# Patient Record
Sex: FEMALE | Employment: OTHER
[De-identification: names, ages, dates, MRNs, and addresses within clinical notes are randomized per-mention and may not be internally consistent; named-entity substitution may affect disease eponyms.]

---

## 2017-07-15 ENCOUNTER — HEALTH MAINTENANCE LETTER (OUTPATIENT)
Age: 57
End: 2017-07-15

## 2019-07-21 ENCOUNTER — HOSPITAL ENCOUNTER (EMERGENCY)
Facility: CLINIC | Age: 59
Discharge: HOME | End: 2019-07-21
Attending: INTERNAL MEDICINE | Admitting: INTERNAL MEDICINE

## 2019-07-21 VITALS
WEIGHT: 154 LBS | SYSTOLIC BLOOD PRESSURE: 197 MMHG | DIASTOLIC BLOOD PRESSURE: 94 MMHG | HEART RATE: 91 BPM | OXYGEN SATURATION: 96 % | RESPIRATION RATE: 18 BRPM | TEMPERATURE: 98.2 F

## 2019-07-21 DIAGNOSIS — K08.89 PAIN, DENTAL: ICD-10-CM

## 2019-07-21 PROCEDURE — 99283 EMERGENCY DEPT VISIT LOW MDM: CPT | Mod: 25 | Performed by: INTERNAL MEDICINE

## 2019-07-21 PROCEDURE — 64400 NJX AA&/STRD TRIGEMINAL NRV: CPT | Mod: Z6 | Performed by: INTERNAL MEDICINE

## 2019-07-21 PROCEDURE — 64400 NJX AA&/STRD TRIGEMINAL NRV: CPT | Performed by: INTERNAL MEDICINE

## 2019-07-21 PROCEDURE — 25000125 ZZHC RX 250: Performed by: INTERNAL MEDICINE

## 2019-07-21 RX ORDER — PENICILLIN V POTASSIUM 500 MG/1
500 TABLET, FILM COATED ORAL 4 TIMES DAILY
Qty: 40 TABLET | Refills: 0 | Status: SHIPPED | OUTPATIENT
Start: 2019-07-21 | End: 2019-07-31

## 2019-07-21 RX ORDER — CANDESARTAN 16 MG/1
16 TABLET ORAL DAILY
COMMUNITY

## 2019-07-21 RX ORDER — TRAMADOL HYDROCHLORIDE 50 MG/1
50 TABLET ORAL EVERY 6 HOURS PRN
Qty: 2 TABLET | Refills: 0 | Status: SHIPPED | OUTPATIENT
Start: 2019-07-21 | End: 2020-07-28

## 2019-07-21 RX ORDER — BUPIVACAINE HYDROCHLORIDE AND EPINEPHRINE 5; 5 MG/ML; UG/ML
10 INJECTION, SOLUTION PERINEURAL ONCE
Status: COMPLETED | OUTPATIENT
Start: 2019-07-21 | End: 2019-07-21

## 2019-07-21 RX ORDER — BUPIVACAINE HYDROCHLORIDE AND EPINEPHRINE 5; 5 MG/ML; UG/ML
10 INJECTION, SOLUTION EPIDURAL; INTRACAUDAL; PERINEURAL ONCE
Status: DISCONTINUED | OUTPATIENT
Start: 2019-07-21 | End: 2019-07-21

## 2019-07-21 RX ADMIN — BUPIVACAINE HYDROCHLORIDE AND EPINEPHRINE BITARTRATE 10 ML: 5; .005 INJECTION, SOLUTION EPIDURAL; INTRACAUDAL; PERINEURAL at 21:52

## 2019-07-21 NOTE — ED AVS SNAPSHOT
[facility], [place], Emergency Department  [address] [facility] [address]  Phone:  [phone]                                    Yane Hamlin   MRN: [id number]    Department:  [facility], Emergency Department   Date of Visit:  7/21/2019           After Visit Summary Signature Page    I have received my discharge instructions, and my questions have been answered. I have discussed any challenges I see with this plan with the nurse or doctor.    ..........................................................................................................................................  Patient/Patient Representative Signature      ..........................................................................................................................................  Patient Representative Print Name and Relationship to Patient    ..................................................               ................................................  Date                                   Time    ..........................................................................................................................................  Reviewed by Signature/Title    ...................................................              ..............................................  Date                                               Time          22EPIC Rev 08/18

## 2019-07-22 ENCOUNTER — NURSE TRIAGE (OUTPATIENT)
Dept: NURSING | Facility: CLINIC | Age: 59
End: 2019-07-22

## 2019-07-22 NOTE — ED PROVIDER NOTES
History     Chief Complaint   Patient presents with     Dental Pain     HPI  Yane Hamlin is a 58 year old female with a history of hypertension, who presents to the Emergency Department for evaluation of dental pain. Patient complains of right lower molar pain, of tooth #20, ongoing for a few days. She reportedly had a dental procedure done a month ago, and recently developed the pain. She apparently spoke with her clinic or dentist who reportedly instructed her to come here for further evaluation of her elevated blood pressure, thought related to her pain, as well as for management of her pain until she is able to be seen by her dentist tomorrow. Patient states that she has attempted to manage her pain with over-the-counter remedies including Tylenol and ibuprofen to no avail. She denies any other complaints at this time.     I have reviewed the Medications, Allergies, Past Medical and Surgical History, and Social History in the [facility] system.    Past Medical History:   Diagnosis Date     Hypertension        History reviewed. No pertinent surgical history.    History reviewed. No pertinent family history.    Social History     Tobacco Use     Smoking status: Never Smoker     Smokeless tobacco: Never Used   Substance Use Topics     Alcohol use: Not on file     No current facility-administered medications for this encounter.      Current Outpatient Medications   Medication     candesartan (ATACAND) 16 MG tablet     penicillin V (VEETID) 500 MG tablet     traMADol (ULTRAM) 50 MG tablet      No Known Allergies    Review of Systems   HENT: Positive for dental problem (tooth #20).    All other systems reviewed and are negative.      Physical Exam   BP: (!) 206/104  Pulse: 91  Heart Rate: 97  Temp: 98.2  F (36.8  C)  Resp: 16  Weight: 69.9 kg (154 lb)  SpO2: 96 %      Physical Exam   HENT:   Mouth/Throat: No oral lesions. No trismus in the jaw. Abnormal dentition. Dental caries present. No dental abscesses or uvula  swelling.           ED Course        Procedures               Labs Ordered and Resulted from Time of ED Arrival Up to the Time of Departure from the ED - No data to display         Assessments & Plan (with Medical Decision Making)  Dental caries on tooth #20 but no periapical abscess, s/p dental block with bupivacaine with epi 3 ml with relief, will discharge with pen vk, tramadol #2, follow up with her Dentist in am or low cost Dental.       I have reviewed the nursing notes.    I have reviewed the findings, diagnosis, plan and need for follow up with the patient.       Medication List      Started    penicillin V 500 MG tablet  Commonly known as:  VEETID  500 mg, Oral, 4 TIMES DAILY     traMADol 50 MG tablet  Commonly known as:  ULTRAM  50 mg, Oral, EVERY 6 HOURS PRN            Final diagnoses:   Pain, dental     ISerenity, am serving as a trained medical scribe to document services personally performed by Jack Olivo MD, based on the provider's statements to me.   Jack ARREDONDO MD, was physically present and have reviewed and verified the accuracy of this note documented by Serenity Shepard.    7/21/2019   [facility], [place], EMERGENCY DEPARTMENT     Jack Olivo MD  07/21/19 [id number]

## 2019-07-22 NOTE — ED TRIAGE NOTES
Pt arrived to the ED with c/o left lower tooth pain since Friday. Pt reports she has not been to a dentist for the pain because they are closed. Pt's BP is also elevated.

## 2019-07-22 NOTE — DISCHARGE INSTRUCTIONS
Please make an appointment to follow up with Your Dentist as soon as possible even if entirely better.    Many of these clinics offer a sliding fee option for patients that qualify, and see patients on a walk-in or same day basis. Please call each clinic directly. As services, hours, fees and policies vary greatly.    [place]:  [facility]     [phone]  [facility] ([facility]) [phone]  [facility]  [phone]  [facility]      [phone]   formerly [facility]    [phone]  [facility]  [phone]  [facility] ([facility]) [phone]  [facility]     [phone]  [facility]   [phone]  [facility] (cash only)   [phone]  [facility]    [phone] (adults)          [phone] (children)    [place]:  [facility]     [phone]; [phone]  [facility]     [phone]  [facility]     [phone]  [facility] (free, limited)    [phone]    Multiple Locations:  [facility]       [phone]

## 2019-07-22 NOTE — TELEPHONE ENCOUNTER
Yane had a local oral anesthetic per injection given to her when in [facility] ER last evening for dental caries pain.     She was also prescribed 2 50mg tramadol to cover her pain once the block wore off. She took one of the tramadol during the night and the second one already his morning.She said she didn't start the amoxicillin until this morning because she didn't pick it up until this morning.     A little discrepancy noted in that she apparently picked up the tramadol last night but didn't get the antibiotic until this morning.    She called stating she needs more pain medication from the ER doc she saw yesterday. She was argumentative when I told her the ER doc cannot write a RX for her. I told her she can return to the ER, call her pcp, go to an uc or try OTC's until the pain improves with the antibiotic.     She called her dentist today and apparently asked for pain medication. She told me her dentist said she won't write her a prescription for an opioid.  Yane didn't say if she made an appointment with her dentist or not.    Yane stated understanding of the options I gave her.  Iva ROSE RN [facility]

## 2020-07-28 ENCOUNTER — OFFICE VISIT (OUTPATIENT)
Dept: URGENT CARE | Facility: URGENT CARE | Age: 60
End: 2020-07-28
Payer: COMMERCIAL

## 2020-07-28 VITALS
OXYGEN SATURATION: 94 % | TEMPERATURE: 98 F | SYSTOLIC BLOOD PRESSURE: 162 MMHG | WEIGHT: 157.9 LBS | DIASTOLIC BLOOD PRESSURE: 90 MMHG | HEART RATE: 86 BPM

## 2020-07-28 DIAGNOSIS — I10 HYPERTENSION, UNSPECIFIED TYPE: Primary | ICD-10-CM

## 2020-07-28 LAB
ANION GAP SERPL CALCULATED.3IONS-SCNC: 13 MMOL/L (ref 3–14)
BASOPHILS # BLD AUTO: 0.1 10E9/L (ref 0–0.2)
BASOPHILS NFR BLD AUTO: 0.5 %
BUN SERPL-MCNC: 13 MG/DL (ref 7–30)
CALCIUM SERPL-MCNC: 9.6 MG/DL (ref 8.5–10.1)
CHLORIDE SERPL-SCNC: 101 MMOL/L (ref 94–109)
CO2 SERPL-SCNC: 28 MMOL/L (ref 20–32)
CREAT SERPL-MCNC: 0.9 MG/DL (ref 0.52–1.04)
DIFFERENTIAL METHOD BLD: NORMAL
EOSINOPHIL # BLD AUTO: 0.2 10E9/L (ref 0–0.7)
EOSINOPHIL NFR BLD AUTO: 1.9 %
ERYTHROCYTE [DISTWIDTH] IN BLOOD BY AUTOMATED COUNT: 12.5 % (ref 10–15)
GFR SERPL CREATININE-BSD FRML MDRD: 68 ML/MIN/{1.73_M2}
GLUCOSE SERPL-MCNC: 124 MG/DL (ref 70–99)
HCT VFR BLD AUTO: 41.3 % (ref 35–47)
HGB BLD-MCNC: 13.6 G/DL (ref 11.7–15.7)
LYMPHOCYTES # BLD AUTO: 2.7 10E9/L (ref 0.8–5.3)
LYMPHOCYTES NFR BLD AUTO: 27.8 %
MCH RBC QN AUTO: 30.4 PG (ref 26.5–33)
MCHC RBC AUTO-ENTMCNC: 32.9 G/DL (ref 31.5–36.5)
MCV RBC AUTO: 92 FL (ref 78–100)
MONOCYTES # BLD AUTO: 0.7 10E9/L (ref 0–1.3)
MONOCYTES NFR BLD AUTO: 6.8 %
NEUTROPHILS # BLD AUTO: 6.2 10E9/L (ref 1.6–8.3)
NEUTROPHILS NFR BLD AUTO: 63 %
PLATELET # BLD AUTO: 273 10E9/L (ref 150–450)
POTASSIUM SERPL-SCNC: 3.9 MMOL/L (ref 3.4–5.3)
RBC # BLD AUTO: 4.48 10E12/L (ref 3.8–5.2)
SODIUM SERPL-SCNC: 142 MMOL/L (ref 133–144)
TROPONIN I SERPL-MCNC: <0.015 UG/L (ref 0–0.04)
WBC # BLD AUTO: 9.9 10E9/L (ref 4–11)

## 2020-07-28 PROCEDURE — 99203 OFFICE O/P NEW LOW 30 MIN: CPT | Performed by: PHYSICIAN ASSISTANT

## 2020-07-28 PROCEDURE — 84484 ASSAY OF TROPONIN QUANT: CPT | Performed by: PHYSICIAN ASSISTANT

## 2020-07-28 PROCEDURE — 80048 BASIC METABOLIC PNL TOTAL CA: CPT | Performed by: PHYSICIAN ASSISTANT

## 2020-07-28 PROCEDURE — 36415 COLL VENOUS BLD VENIPUNCTURE: CPT | Performed by: PHYSICIAN ASSISTANT

## 2020-07-28 PROCEDURE — 93000 ELECTROCARDIOGRAM COMPLETE: CPT | Performed by: PHYSICIAN ASSISTANT

## 2020-07-28 PROCEDURE — 85025 COMPLETE CBC W/AUTO DIFF WBC: CPT | Performed by: PHYSICIAN ASSISTANT

## 2020-07-28 RX ORDER — HYDROCHLOROTHIAZIDE 12.5 MG/1
12.5 TABLET ORAL DAILY
Qty: 20 TABLET | Refills: 0 | Status: SHIPPED | OUTPATIENT
Start: 2020-07-28 | End: 2023-09-13

## 2020-07-29 NOTE — PATIENT INSTRUCTIONS
Start taking 1 pills (32 mg) daily for high blood pressure and continue to monitor BP.   Start taking hydrochlorothiazide daily and monitor BP.   Contact your PCP in regards to this visit today.    Patient Education     Established High Blood Pressure    High blood pressure (hypertension) is a chronic disease. Often, healthcare providers don t know what causes it. But it can be caused by certain health conditions and medicines.  If you have high blood pressure, you may not have any symptoms. If you do have symptoms, they may include headache, dizziness, changes in your vision, chest pain, and shortness of breath. But even without symptoms, high blood pressure that s not treated raises your risk for heart attack, heart failure, and stroke. High blood pressure is a serious health risk and shouldn t be ignored.  Blood pressure measurements are given as 2 numbers. Systolic blood pressure is the upper number. This is the pressure when the heart contracts. Diastolic blood pressure is the lower number. This is the pressure when the heart relaxes between beats. You will see your blood pressure readings written together. For example, a person with a systolic pressure of 118 and a diastolic pressure of 78 will have 118/78 written in the medical record.  Blood pressure is categorized as normal, elevated, or stage 1 or stage 2 high blood pressure:    Normal blood pressure is systolic of less than 120 and diastolic of less than 80 (120/80)    Elevated blood pressure is systolic of 120 to 129 and diastolic less than 80    Stage 1 high blood pressure is systolic is 130 to 139 or diastolic between 80 to 89    Stage 2 high blood pressure is when systolic is 140 or higher or the diastolic is 90 or higher  Home care  If you have high blood pressure, follow these home care guidelines to help lower your blood pressure. If you are taking medicines for high blood pressure, these methods may reduce or end your need for medicines in the  future.    Start a weight-loss program if you are overweight.    Cut back on how much salt you get in your diet. Here s how to do this:  ? Don t eat foods that have a lot of salt. These include olives, pickles, smoked meats, and salted potato chips.  ? Don t add salt to your food at the table.  ? Use only small amounts of salt when cooking.    Start an exercise program. Talk with your healthcare provider about the type of exercise program that would be best for you. It doesn't have to be hard. Even brisk walking for 20 minutes 3 times a week is a good form of exercise.    Don t take medicines that stimulate the heart. This includes many over-the-counter cold and sinus decongestant pills and sprays, as well as diet pills. Check the warnings about high blood pressure on the label. Before buying any over-the-counter medicines or supplements, always ask the pharmacist about the product's potential interaction with your high blood pressure and your high blood pressure medicines.    Stimulants such as amphetamine or cocaine could be deadly for someone with high blood pressure. Never take these.    Limit how much caffeine you get in your diet. Switch to caffeine-free products.    Stop smoking. If you are a long-time smoker, this can be hard. Talk to your healthcare provider about medicines and nicotine replacement options to help you. Also, enroll in a stop-smoking program to make it more likely that you will quit for good.    Learn how to handle stress. This is an important part of any program to lower blood pressure. Learn about relaxation methods like meditation, yoga, or biofeedback.    If your provider prescribed medicines, take them exactly as directed. Missing doses may cause your blood pressure get out of control.    If you miss a dose or doses, check with your healthcare provider or pharmacist about what to do.    Consider buying an automatic blood pressure machine to check your blood pressure at home. Ask your  provider for a recommendation. You can get one of these at most pharmacies.     The American Heart Association recommends the following guidelines for home blood pressure monitoring:    Don't smoke or drink coffee for 30 minutes before taking your blood pressure.    Go to the bathroom before the test.    Relax for 5 minutes before taking the measurement.    Sit with your back supported (don't sit on a couch or soft chair); keep your feet on the floor uncrossed. Place your arm on a solid flat surface (like a table) with the upper part of the arm at heart level. Place the middle of the cuff directly above the bend of the elbow. Check the monitor's instruction manual for an illustration.    Take multiple readings. When you measure, take 2 to 3 readings one minute apart and record all of the results.    Take your blood pressure at the same time every day, or as your healthcare provider recommends.    Record the date, time, and blood pressure reading.    Take the record with you to your next medical appointment. If your blood pressure monitor has a built-in memory, simply take the monitor with you to your next appointment.    Call your provider if you have several high readings. Don't be frightened by a single high blood pressure reading, but if you get several high readings, check in with your healthcare provider.    Note: When blood pressure reaches a systolic (top number) of 180 or higher OR diastolic (bottom number) of 110 or higher, seek emergency medical treatment.  Follow-up care  You will need to see your healthcare provider regularly. This is to check your blood pressure and to make changes to your medicines. Make a follow-up appointment as directed. Bring the record of your home blood pressure readings to the appointment.  When to seek medical advice  Call your healthcare provider right away if any of these occur:    Blood pressure reaches a systolic (upper number) of 180 or higher OR a diastolic (bottom  number) of 110 or higher    Chest pain or shortness of breath    Severe headache    Throbbing or rushing sound in the ears    Nosebleed    Sudden severe pain in your belly (abdomen)    Extreme drowsiness, confusion, or fainting    Dizziness or spinning sensation (vertigo)    Weakness of an arm or leg or one side of the face    You have problems speaking or seeing   Date Last Reviewed: 12/1/2016 2000-2019 [employer]. [address] [place] [address], [place] [address]. All rights reserved. This information is not intended as a substitute for professional medical care. Always follow your healthcare professional's instructions.

## 2020-07-29 NOTE — PROGRESS NOTES
CHIEF COMPLAINT:   Chief Complaint   Patient presents with     HIGH BLOOD PRESSURE     BLOOD PRESSURE RUNNING HIGH SINCE SUNDAY       HPI: Yane Hamlin is a 59 year old female who presents to clinic today for evaluation of high blood pressure. Patient reports that symptoms started 3 days ago. She took her BP at home and received a very high reading of 195/110. She took her BP medication and another medication called dipirone, which is an antiinflammatory, and her BP reduced. She continues to have episodes of high blood pressure. At times she does feel nausea, but she is not feeling this at this time. Denies having fever, chills, body aches, pleurisy, hemoptysis, cough, shortness of breath, congestion, diarrhea, vomiting, weakness, change of vision, headaches or syncope.       Past Medical History:   Diagnosis Date     Hypertension      No past surgical history on file.  Social History     Tobacco Use     Smoking status: Never Smoker     Smokeless tobacco: Never Used   Substance Use Topics     Alcohol use: Not on file     Current Outpatient Medications   Medication     candesartan (ATACAND) 16 MG tablet     No current facility-administered medications for this visit.      No Known Allergies    10 point ROS of systems including Constitutional, Eyes, Respiratory, Cardiovascular, Gastroenterology, Genitourinary, Integumentary, Muscularskeletal, Psychiatric were all negative except for pertinent positives noted in my HPI.        Exam:  BP (!) 176/97   Pulse 86   Temp 98  F (36.7  C)   Wt 71.6 kg (157 lb 14.4 oz)   SpO2 94%   Constitutional: healthy, alert and no distress  Head: Normocephalic, atraumatic.  Eyes: conjunctiva clear, no drainage  ENT: TMs clear and shiny sadi, nasal mucosa pink and moist, throat without tonsillar hypertrophy or erythema  Neck: neck is supple, no cervical lymphadenopathy or nuchal rigidity  Cardiovascular: RRR  Respiratory: CTA bilaterally, no rhonchi or rales  Gastrointestinal: soft and  nontender  Skin: no rashes  Neurologic: Speech clear, gait normal. Moves all extremities.    Results for orders placed or performed in visit on 07/28/20   CBC with platelets and differential     Status: None   Result Value Ref Range    WBC 9.9 4.0 - 11.0 10e9/L    RBC Count 4.48 3.8 - 5.2 10e12/L    Hemoglobin 13.6 11.7 - 15.7 g/dL    Hematocrit 41.3 35.0 - 47.0 %    MCV 92 78 - 100 fl    MCH 30.4 26.5 - 33.0 pg    MCHC 32.9 31.5 - 36.5 g/dL    RDW 12.5 10.0 - 15.0 %    Platelet Count 273 150 - 450 10e9/L    Diff Method Automated Method     % Neutrophils 63.0 %    % Lymphocytes 27.8 %    % Monocytes 6.8 %    % Eosinophils 1.9 %    % Basophils 0.5 %    Absolute Neutrophil 6.2 1.6 - 8.3 10e9/L    Absolute Lymphocytes 2.7 0.8 - 5.3 10e9/L    Absolute Monocytes 0.7 0.0 - 1.3 10e9/L    Absolute Eosinophils 0.2 0.0 - 0.7 10e9/L    Absolute Basophils 0.1 0.0 - 0.2 10e9/L   Basic metabolic panel  (Ca, Cl, CO2, Creat, Gluc, K, Na, BUN)     Status: Abnormal   Result Value Ref Range    Sodium 142 133 - 144 mmol/L    Potassium 3.9 3.4 - 5.3 mmol/L    Chloride 101 94 - 109 mmol/L    Carbon Dioxide 28 20 - 32 mmol/L    Anion Gap 13 3 - 14 mmol/L    Glucose 124 (H) 70 - 99 mg/dL    Urea Nitrogen 13 7 - 30 mg/dL    Creatinine 0.90 0.52 - 1.04 mg/dL    GFR Estimate 68 >60 mL/min/[1.73_m2]    GFR Estimate If Black 79 >60 mL/min/[1.73_m2]    Calcium 9.6 8.5 - 10.1 mg/dL   Troponin I     Status: None   Result Value Ref Range    Troponin I ES <0.015 0.000 - 0.045 ug/L       ekg -- Normal sinus rhythm    ASSESSMENT/PLAN:  1. Hypertension, unspecified type  Patient with elevated BP for the past 3 days. She does endorse having intermittent nausea. On exam, she looks well. NO evidence of ACS/MI on EKG and troponin is negative. Kidney function is WNL as are all her other labs. Her blood pressure is elevated in clinic, but not to the point of hypertensive emergency. She is not have headaches, chest pain, sob, change of vision or  lightheadedness.   Patient requesting medication injection to reduce BP. Suspect that she is speaking of Clonidine, which we do not carry in urgent care. She has room to increase her candesartan, so will increase that to 32mg and add on hydrochlorothiazide. Discussed that she should contact her PCP for further treatment of HTN. I do not feel that it is necessary for her to be seen in the ER, at this point, but went over in detail and outlined RED FLAG symptoms in handout given in clinic.     - EKG 12-lead complete w/read - Clinics  - CBC with platelets and differential  - Basic metabolic panel  (Ca, Cl, CO2, Creat, Gluc, K, Na, BUN)  - Troponin I  - hydrochlorothiazide (HYDRODIURIL) 12.5 MG tablet; Take 1 tablet (12.5 mg) by mouth daily  Dispense: 20 tablet; Refill: 0    Diagnosis and treatment plan was reviewed with patient and/or family.   We went over any labs or imaging. Discussed worsening symptoms or little to no relief despite treatment plan to follow-up with PCP or in ER.  Patient verbalizes understanding. All questions were addressed and answered.   Kamala Roman PA-C

## 2023-05-17 ENCOUNTER — TRANSFERRED RECORDS (OUTPATIENT)
Dept: HEALTH INFORMATION MANAGEMENT | Facility: CLINIC | Age: 63
End: 2023-05-17
Payer: COMMERCIAL

## 2023-05-17 ENCOUNTER — MEDICAL CORRESPONDENCE (OUTPATIENT)
Dept: HEALTH INFORMATION MANAGEMENT | Facility: CLINIC | Age: 63
End: 2023-05-17
Payer: COMMERCIAL

## 2023-05-22 ENCOUNTER — TRANSFERRED RECORDS (OUTPATIENT)
Dept: HEALTH INFORMATION MANAGEMENT | Facility: CLINIC | Age: 63
End: 2023-05-22
Payer: COMMERCIAL

## 2023-05-23 ENCOUNTER — TRANSCRIBE ORDERS (OUTPATIENT)
Dept: OTHER | Age: 63
End: 2023-05-23

## 2023-05-23 DIAGNOSIS — G47.30 SLEEP APNEA: Primary | ICD-10-CM

## 2023-06-01 ENCOUNTER — TRANSCRIBE ORDERS (OUTPATIENT)
Dept: OTHER | Age: 63
End: 2023-06-01

## 2023-06-01 DIAGNOSIS — R93.89 ABNORMAL CT OF THE CHEST: Primary | ICD-10-CM

## 2023-06-05 ENCOUNTER — TELEPHONE (OUTPATIENT)
Dept: PULMONOLOGY | Facility: CLINIC | Age: 63
End: 2023-06-05
Payer: COMMERCIAL

## 2023-06-05 NOTE — TELEPHONE ENCOUNTER
Left Voicemail with Family Member (1st Attempt) for the patient to call back and schedule the following:    Appointment type: NEW  Provider: NA  Return date: 6/5/23  Specialty phone number: [phone]  Additional appointment(s) needed: CXR, LAB  Additonal Notes: 6/5/23 - Spoke to [facility] staff; pt's referral provider Emily Vilchis PA-C, is not in the office today to schedule appointment for pt. Gave staff member yvonne call ctr phone number to pass on to provider. [facility]

## 2023-06-13 ENCOUNTER — TELEPHONE (OUTPATIENT)
Dept: PULMONOLOGY | Facility: CLINIC | Age: 63
End: 2023-06-13
Payer: COMMERCIAL

## 2023-06-13 NOTE — TELEPHONE ENCOUNTER
Left Voicemail with Family Member (2nd Attempt) for the patient to call back and schedule the following:    Appointment type: New  Provider: None  Return date: 6/13/2023  Specialty phone number: [phone]  Additional appointment(s) needed: CXR, Lab, FPFT  Additonal Notes: 2nd attempt. LVM for pt's provider to schedule NEW pt appt, per Adult Pulmonary Referral. Pt is in [facility] care; referral from CARLOTA Chavez, PA-C, General Physician Assistant, phone number [phone]. [facility]

## 2023-06-20 ENCOUNTER — TELEPHONE (OUTPATIENT)
Dept: PULMONOLOGY | Facility: CLINIC | Age: 63
End: 2023-06-20
Payer: COMMERCIAL

## 2023-06-20 NOTE — TELEPHONE ENCOUNTER
Patient Contacted     Appointment type: NEW  Provider: KAREN  Return date: 7/24/23  Specialty phone number: [phone]  Additional appointment(s) needed: NA  Additonal Notes: pt completed imaging and PFT already. Pt requested mailed itinerary. Pt's street address was verified.

## 2023-06-20 NOTE — TELEPHONE ENCOUNTER
Attempted to reach patient regarding pulmonary consultation that is scheduled with Dr. Brown on 07/24/2023. Patient did not answer call and her voicemail box is full.    Patient will need to sign an SAMUEL with [facility] so that  pulmonary medicine is able to obtain medical records, including PFT testing. If unable to obtain PFT report, patient will need to conduct a complete PFT prior to seeing pulmonologist, as there is no record of patient ever completing a PFT in medical record.    Patient has been scheduled for a tentative PFT in [place] on 07/27/2023 at 0930. Patient's consultation with Dr. Brown can be moved to 11:30 am if pt is needing to conduct the PFT at 0930.    Patient is not active on MyChart. Will attempt to contact at another time to explain.      Josey Estrada LPN  Pulmonary Medicine:  [facility]  Phone: [phone] Fax: [phone]

## 2023-06-22 NOTE — TELEPHONE ENCOUNTER
Spoke with patient regarding outside PFT testing. Patient is unsure if she has had this testing done. Informed patient that she will need to complete a PFT prior to being seen by pulmonary clinic. Patient is agreeable and has been scheduled for a PFT 07/24/2023 @ 12 pm.    Josey Estrada LPN  Pulmonary Medicine:  [facility]  Phone: [phone] Fax: [phone]

## 2023-08-08 ENCOUNTER — TELEPHONE (OUTPATIENT)
Dept: PULMONOLOGY | Facility: CLINIC | Age: 63
End: 2023-08-08
Payer: COMMERCIAL

## 2023-08-08 ENCOUNTER — TELEPHONE (OUTPATIENT)
Dept: SLEEP MEDICINE | Facility: CLINIC | Age: 63
End: 2023-08-08
Payer: COMMERCIAL

## 2023-08-08 DIAGNOSIS — R93.89 ABNORMAL CHEST CT: Primary | ICD-10-CM

## 2023-08-08 NOTE — TELEPHONE ENCOUNTER
Reason for Call:  Appointment Request    Patient requesting this type of appt:  Sleep consult    Requested provider:  any provider     Reason patient unable to be scheduled: Not within requested timeframe    When does patient want to be seen/preferred time:  Prefer Sept. Pt will not be   be in [place] by Oct 15 and be back next year.    Comments: Would like an appt before she leaves in Oct.    Okay to leave a detailed message?: Yes at Cell number on file:    Telephone Information:   Mobile [phone]       Call taken on 8/8/2023 at 3:24 PM by Bishop Wu

## 2023-08-08 NOTE — TELEPHONE ENCOUNTER
Left Voicemail (1st Attempt) for the patient to call back and schedule the following:    Appointment type: CXR  Provider: ANISA  Return date: 09/13/23  Specialty phone number: [phone]  Additional appointment(s) needed: N/A   Additonal Notes: N/A

## 2023-08-09 NOTE — CONFIDENTIAL NOTE
RECORDS RECEIVED FROM: internal    DATE RECEIVED: 9.13.23    NOTES STATUS DETAILS   OFFICE NOTE from referring provider internal  Mame, Emily BULL, CHAVA VAN   OFFICE NOTE from other specialist     DISCHARGE SUMMARY from hospital     DISCHARGE REPORT from the ER     MEDICATION LIST internal     IMAGING  (NEED IMAGES AND REPORTS)     CT SCAN ce [facility]- 5/7/23    CHEST XRAY (CXR) ce [facility]- 5/7/23    TESTS     PULMONARY FUNCTION TESTING (PFT) internal  Scheduled 9.13.23        Action 8/9/23 sv    Action Taken Message sent to CC pool to help schedule CXR order

## 2023-08-12 ENCOUNTER — TELEPHONE (OUTPATIENT)
Dept: PULMONOLOGY | Facility: CLINIC | Age: 63
End: 2023-08-12
Payer: COMMERCIAL

## 2023-08-12 NOTE — TELEPHONE ENCOUNTER
Left Voicemail (2nd Attempt) for the patient to call back and schedule the following:    Appointment type: CXR  Provider: ANISA  Return date: 09/13/23  Specialty phone number: [phone]  Additional appointment(s) needed: FPFT  Additonal Notes: N/A

## 2023-09-04 PROBLEM — G47.33 OSA (OBSTRUCTIVE SLEEP APNEA): Status: ACTIVE | Noted: 2023-09-04

## 2023-09-05 ENCOUNTER — VIRTUAL VISIT (OUTPATIENT)
Dept: SLEEP MEDICINE | Facility: CLINIC | Age: 63
End: 2023-09-05
Payer: COMMERCIAL

## 2023-09-05 DIAGNOSIS — R53.81 MALAISE AND FATIGUE: ICD-10-CM

## 2023-09-05 DIAGNOSIS — R06.00 DYSPNEA AND RESPIRATORY ABNORMALITY: Primary | ICD-10-CM

## 2023-09-05 DIAGNOSIS — Z72.820 LACK OF ADEQUATE SLEEP: ICD-10-CM

## 2023-09-05 DIAGNOSIS — R06.89 DYSPNEA AND RESPIRATORY ABNORMALITY: Primary | ICD-10-CM

## 2023-09-05 DIAGNOSIS — R53.83 MALAISE AND FATIGUE: ICD-10-CM

## 2023-09-05 DIAGNOSIS — G47.30 SLEEP APNEA, UNSPECIFIED TYPE: ICD-10-CM

## 2023-09-05 DIAGNOSIS — F41.9 ANXIETY: ICD-10-CM

## 2023-09-05 DIAGNOSIS — I10 ESSENTIAL HYPERTENSION: ICD-10-CM

## 2023-09-05 PROCEDURE — 99204 OFFICE O/P NEW MOD 45 MIN: CPT | Mod: VID | Performed by: PHYSICIAN ASSISTANT

## 2023-09-05 ASSESSMENT — PAIN SCALES - GENERAL: PAINLEVEL: NO PAIN (0)

## 2023-09-05 NOTE — NURSING NOTE
Patient denies any changes since check-in regarding medication and allergies and states all information entered during check-in remains accurate.     Is the patient currently in the state of [place]? YES    Visit mode:VIDEO    If the visit is dropped, the patient can be reconnected by: VIDEO VISIT: Send to e-mail at: [email or web address]    Will anyone else be joining the visit? NO  (If patient encounters technical issues they should call [phone]    How would you like to obtain your AVS? Mail a copy    Are changes needed to the allergy or medication list? No, Pt stated no changes to allergies, and Pt stated no med changes    Reason for visit: Consult (Consult to discuss sleep apnea, pt states she has not had sleep study done yet. Medications/allergies reconciled and reviewed with pt, up to date. No pt reported vitals today per pt. )    Has patient had flu shot for current/most recent flu season? If so, when? No    Andre Godinez, Visit Facilitator/MA.

## 2023-09-05 NOTE — PATIENT INSTRUCTIONS
"          MY TREATMENT INFORMATION FOR SLEEP APNEA-  Yane Hamlin    DOCTOR : CARLOTA Horne    Am I having a sleep study at a sleep center?  --->Due to normal delays, you will be contacted within 2-4 weeks to schedule    Am I having a home sleep study?  --->Watch the video for the device you are using:    -/drop off device-   https://www.bluepulseube.com/watch?v=yGGFBdELGhk    -Disposable device sent out require phone/computer application-   https://www.[employer].com/watch?v=BCce_vbiwxE      Frequently asked questions:  1. What is Obstructive Sleep Apnea (LEV)? LEV is the most common type of sleep apnea. Apnea means, \"without breath.\"  Apnea is most often caused by narrowing or collapse of the upper airway as muscles relax during sleep.   Almost everyone has occasional apneas. Most people with sleep apnea have had brief interruptions at night frequently for many years.  The severity of sleep apnea is related to how frequent and severe the events are.   2. What are the consequences of LEV? Symptoms include: feeling sleepy during the day, snoring loudly, gasping or stopping of breathing, trouble sleeping, and occasionally morning headaches or heartburn at night.  Sleepiness can be serious and even increase the risk of falling asleep while driving. Other health consequences may include development of high blood pressure and other cardiovascular disease in persons who are susceptible. Untreated LEV  can contribute to heart disease, stroke and diabetes.   3. What are the treatment options? In most situations, sleep apnea is a lifelong disease that must be managed with daily therapy. Medications are not effective for sleep apnea and surgery is generally not considered until other therapies have been tried. Your treatment is your choice . Continuous Positive Airway (CPAP) works right away and is the therapy that is effective in nearly everyone. An oral device to hold your jaw forward is usually the next most " reliable option. Other options include postioning devices (to keep you off your back), weight loss, and surgery including a tongue pacing device. There is more detail about some of these options below.  4. Are my sleep studies covered by insurance? Although we will request verification of coverage, we advise you also check in advance of the study to ensure there is coverage.    Important tips for those choosing CPAP and similar devices  For new devices, sign up for device LIANG to monitor your device for your followup visits  We encourage you to utilize the [employer] liang or website (myAir web (resmed.com) ) to monitor your therapy progress and share the data with your healthcare team when you discuss your sleep apnea.                                                    Know your equipment:  CPAP is continuous positive airway pressure that prevents obstructive sleep apnea by keeping the throat from collapsing while you are sleeping. In most cases, the device is  smart  and can slowly self-adjusts if your throat collapses and keeps a record every day of how well you are treated-this information is available to you and your care team.  BPAP is bilevel positive airway pressure that keeps your throat open and also assists each breath with a pressure boost to maintain adequate breathing.  Special kinds of BPAP are used in patients who have inadequate breathing from lung or heart disease. In most cases, the device is  smart  and can slowly self-adjusts to assist breathing. Like CPAP, the device keeps a record of how well you are treated.  Your mask is your connection to the device. You get to choose what feels most comfortable and the staff will help to make sure if fits. Here: are some examples of the different masks that are available:       Key points to remember on your journey with sleep apnea:  Sleep study.  PAP devices often need to be adjusted during a sleep study to show that they are effective and adjusted  right.  Good tips to remember: Try wearing just the mask during a quiet time during the day so your body adapts to wearing it. A humidifier is recommended for comfort in most cases to prevent drying of your nose and throat. Allergy medication from your provider may help you if you are having nasal congestion.  Getting settled-in. It takes more than one night for most of us to get used to wearing a mask. Try wearing just the mask during a quiet time during the day so your body adapts to wearing it. A humidifier is recommended for comfort in most cases. Our team will work with you carefully on the first day and will be in contact within 4 days and again at 2 and 4 weeks for advice and remote device adjustments. Your therapy is evaluated by the device each day.   Use it every night. The more you are able to sleep naturally for 7-8 hours, the more likely you will have good sleep and to prevent health risks or symptoms from sleep apnea. Even if you use it 4 hours it helps. Occasionally all of us are unable to use a medical therapy, in sleep apnea, it is not dangerous to miss one night.   Communicate. Call our skilled team on the number provided on the first day if your visit for problems that make it difficult to wear the device. Over 2 out of 3 patients can learn to wear the device long-term with help from our team. Remember to call our team or your sleep providers if you are unable to wear the device as we may have other solutions for those who cannot adapt to mask CPAP therapy. It is recommended that you sleep your sleep provider within the first 3 months and yearly after that if you are not having problems.   Use it for your health. We encourage use of CPAP masks during daytime quiet periods to allow your face and brain to adapt to the sensation of CPAP so that it will be a more natural sensation to awaken to at night or during naps. This can be very useful during the first few weeks or months of adapting to CPAP  though it does not help medically to wear CPAP during wakefulness and  should not be used as a strategy just to meet guidelines.  Take care of your equipment. Make sure you clean your mask and tubing using directions every day and that your filter and mask are replaced as recommended or if they are not working.     BESIDES CPAP, WHAT OTHER THERAPIES ARE THERE?    Positioning Device  Positioning devices are generally used when sleep apnea is mild and only occurs on your back.This example shows a pillow that straps around the waist. It may be appropriate for those whose sleep study shows milder sleep apnea that occurs primarily when lying flat on one's back. Preliminary studies have shown benefit but effectiveness at home may need to be verified by a home sleep test. These devices are generally not covered by medical insurance.  Examples of devices that maintain sleeping on the back to prevent snoring and mild sleep apnea.    Belt type body positioner  http://[employer].TV/    Electronic reminder  http://nightshifttherapy.[employer]/            Oral Appliance  What is oral appliance therapy?  An oral appliance device fits on your teeth at night like a retainer used after having braces. The device is made by a specialized dentist and requires several visits over 1-2 months before a manufactured device is made to fit your teeth and is adjusted to prevent your sleep apnea. Once an oral device is working properly, snoring should be improved. A home sleep test may be recommended at that time if to determine whether the sleep apnea is adequately treated.       Some things to remember:  -Oral devices are often, but not always, covered by your medical insurance. Be sure to check with your insurance provider.   -If you are referred for oral therapy, you will be given a list of specialized dentists to consider or you may choose to visit the Web site of the American Academy of Dental Sleep Medicine  -Oral devices are less likely to work  if you have severe sleep apnea or are extremely overweight.     More detailed information  An oral appliance is a small acrylic device that fits over the upper and lower teeth  (similar to a retainer or a mouth guard). This device slightly moves jaw forward, which moves the base of the tongue forward, opens the airway, improves breathing for effective treat snoring and obstructive sleep apnea in perhaps 7 out of 10 people .  The best working devices are custom-made by a dental device  after a mold is made of the teeth 1, 2, 3.  When is an oral appliance indicated?  Oral appliance therapy is recommended as a first-line treatment for patients with primary snoring, mild sleep apnea, and for patients with moderate sleep apnea who prefer appliance therapy to use of CPAP4, 5. Severity of sleep apnea is determined by sleep testing and is based on the number of respiratory events per hour of sleep.   How successful is oral appliance therapy?  The success rate of oral appliance therapy in patients with mild sleep apnea is 75-80% while in patients with moderate sleep apnea it is 50-70%. The chance of success in patients with severe sleep apnea is 40-50%. The research also shows that oral appliances have a beneficial effect on the cardiovascular health of LEV patients at the same magnitude as CPAP therapy7.  Oral appliances should be a second-line treatment in cases of severe sleep apnea, but if not completely successful then a combination therapy utilizing CPAP plus oral appliance therapy may be effective. Oral appliances tend to be effective in a broad range of patients although studies show that the patients who have the highest success are females, younger patients, those with milder disease, and less severe obesity. 3, 6.   Finding a dentist that practices dental sleep medicine  Specific training is available through the American Academy of Dental Sleep Medicine for dentists interested in working in the field  of sleep. To find a dentist who is educated in the field of sleep and the use of oral appliances, near you, visit the Web site of the American Academy of Dental Sleep Medicine.    References  1. Elizabeth et al. Objectively measured vs self-reported compliance during oral appliance therapy for sleep-disordered breathing. Chest 2013; 144(5): 5589-5968.  2. Roger, et al. Objective measurement of compliance during oral appliance therapy for sleep-disordered breathing. Thorax 2013; 68(1): 91-96.  3. Robles et al. Mandibular advancement devices in 620 men and women with LEV and snoring: tolerability and predictors of treatment success. Chest 2004; 125: 3561-2635.  4. Ajith et al. Oral appliances for snoring and LEV: a review. Sleep 2006; 29: 244-262.  5. Leelee et al. Oral appliance treatment for LEV: an update. J Clin Sleep Med 2014; 10(2): 215-227.  6. Villa et al. Predictors of OSAH treatment outcome. J Dent Res 2007; 86: 2823-8468.      Weight Loss:    Weight loss is a long-term strategy that may improve sleep apnea in some patients.    Weight management is a personal decision and the decision should be based on your interest and the potential benefits.  If you are interested in exploring weight loss strategies, the following discussion covers the impact on weight loss on sleep apnea and the approaches that may be successful.    Being overweight does not necessarily mean you will have health consequences.  Those who have BMI over 35 or over 27 with existing medical conditions carries greater risk.   Weight loss decreases severity of sleep apnea in most people with obesity. For those with mild obesity who have developed snoring with weight gain, even 15-30 pound weight loss can improve and occasionally eliminate sleep apnea.  Structured and life-long dietary and health habits are necessary to lose weight and keep healthier weight levels.     Though there may be significant health benefits from  weight loss, long-term weight loss is very difficult to achieve- studies show success with dietary management in less than 10% of people. In addition, substantial weight loss may require years of dietary control and may be difficult if patients have severe obesity. In these cases, surgical management may be considered.  Finally, older individuals who have tolerated obesity without health complications may be less likely to benefit from weight loss strategies.      [unfilled]    Surgery:    Surgery for obstructive sleep apnea is considered generally only when other therapies fail to work. Surgery may be discussed with you if you are having a difficult time tolerating CPAP and or when there is an abnormal structure that requires surgical correction.  Nose and throat surgeries often enlarge the airway to prevent collapse.  Most of these surgeries create pain for 1-2 weeks and up to half of the most common surgeries are not effective throughout life.  You should carefully discuss the benefits and drawbacks to surgery with your sleep provider and surgeon to determine if it is the best solution for you.   More information  Surgery for LEV is directed at areas that are responsible for narrowing or complete obstruction of the airway during sleep.  There are a wide range of procedures available to enlarge and/or stabilize the airway to prevent blockage of breathing in the three major areas where it can occur: the palate, tongue, and nasal regions.  Successful surgical treatment depends on the accurate identification of the factors responsible for obstructive sleep apnea in each person.  A personalized approach is required because there is no single treatment that works well for everyone.  Because of anatomic variation, consultation with an examination by a sleep surgeon is a critical first step in determining what surgical options are best for each patient.  In some cases, examination during sedation may be recommended in  order to guide the selection of procedures.  Patients will be counseled about risks and benefits as well as the typical recovery course after surgery. Surgery is typically not a cure for a person s LEV.  However, surgery will often significantly improve one s LEV severity (termed  success rate ).  Even in the absence of a cure, surgery will decrease the cardiovascular risk associated with OSA7; improve overall quality of life8 (sleepiness, functionality, sleep quality, etc).      Palate Procedures:  Patients with LEV often have narrowing of their airway in the region of their tonsils and uvula.  The goals of palate procedures are to widen the airway in this region as well as to help the tissues resist collapse.  Modern palate procedure techniques focus on tissue conservation and soft tissue rearrangement, rather than tissue removal.  Often the uvula is preserved in this procedure. Residual sleep apnea is common in patient after pharyngoplasty with an average reduction in sleep apnea events of 33%2.      Tongue Procedures:  ExamWhile patients are awake, the muscles that surround the throat are active and keep this region open for breathing. These muscles relax during sleep, allowing the tongue and other structures to collapse and block breathing.  There are several different tongue procedures available.  Selection of a tongue base procedure depends on characteristics seen on physical exam.  Generally, procedures are aimed at removing bulky tissues in this area or preventing the back of the tongue from falling back during sleep.  Success rates for tongue surgery range from 50-62%3.    Hypoglossal Nerve Stimulation:  Hypoglossal nerve stimulation has recently received approval from the [place] Food and Drug Administration for the treatment of obstructive sleep apnea.  This is based on research showing that the system was safe and effective in treating sleep apnea6.  Results showed that the median AHI score  decreased 68%, from 29.3 to 9.0. This therapy uses an implant system that senses breathing patterns and delivers mild stimulation to airway muscles, which keeps the airway open during sleep.  The system consists of three fully implanted components: a small generator (similar in size to a pacemaker), a breathing sensor, and a stimulation lead.  Using a small handheld remote, a patient turns the therapy on before bed and off upon awakening.    Candidates for this device must be greater than 18 years of age, have moderate to severe LEV (AHI between 15-65), BMI less than 35, have tried CPAP/oral appliance for at least 8 weeks without success, and have appropriate upper airway anatomy (determined by a sleep endoscopy performed by Dr. Rick Bishop).    Hypoglossal Nerve Stimulation Pathway:    The sleep surgeon s office will work with the patient through the insurance prior-authorization process (including communications and appeals).    Nasal Procedures:  Nasal obstruction can interfere with nasal breathing during the day and night.  Studies have shown that relief of nasal obstruction can improve the ability of some patients to tolerate positive airway pressure therapy for obstructive sleep apnea1.  Treatment options include medications such as nasal saline, topical corticosteroid and antihistamine sprays, and oral medications such as antihistamines or decongestants. Non-surgical treatments can include external nasal dilators for selected patients. If these are not successful by themselves, surgery can improve the nasal airway either alone or in combination with these other options.      Combination Procedures:  Combination of surgical procedures and other treatments may be recommended, particularly if patients have more than one area of narrowing or persistent positional disease.  The success rate of combination surgery ranges from 66-80%2,3.    References  Jody BULL. The Role of the Nose in Snoring and Obstructive  Sleep Apnoea: An Update.  Eur Arch Otorhinolaryngol. 2011; 268: 1365-73.   Georgina SM; Jaime JA; Leah JR; Pallanch JF; Lloyd MB; Arpan SG; Jonathan FLETCHER. Surgical modifications of the upper airway for obstructive sleep apnea in adults: a systematic review and meta-analysis. SLEEP 2010;33(10):6760-8926. Dio SOMERS. Hypopharyngeal surgery in obstructive sleep apnea: an evidence-based medicine review.  Arch Otolaryngol Head Neck Surg. 2006 Feb;132(2):206-13.  Celso YH1, Magdi Y, Demetris ASM. The efficacy of anatomically based multilevel surgery for obstructive sleep apnea. Otolaryngol Head Neck Surg. 2003 Oct;129(4):327-35.  Kezirian E, Goldberg A. Hypopharyngeal Surgery in Obstructive Sleep Apnea: An Evidence-Based Medicine Review. Arch Otolaryngol Head Neck Surg. 2006 Feb;132(2):206-13.  Analisa MAC et al. Upper-Airway Stimulation for Obstructive Sleep Apnea.  N Engl J Med. 2014 Jan 9;370(2):139-49.  Peker Y et al. Increased Incidence of Cardiovascular Disease in Middle-aged Men with Obstructive Sleep Apnea. Am J Respir Crit Care Med; 2002 166: 159-165  Watson EM et al. Studying Life Effects and Effectiveness of Palatopharyngoplasty (SLEEP) study: Subjective Outcomes of Isolated Uvulopalatopharyngoplasty. Otolaryngol Head Neck Surg. 2011; 144: 623-631.        WHAT IF I ONLY HAVE SNORING?    Mandibular advancement devices, lateral sleep positioning, long-term weight loss and treatment of nasal allergies have been shown to improve snoring.  Exercising tongue muscles with a game (https://apps.[employer].Reocar/us/liang/soundly-reduce-snoring/im7474369991) or stimulating the tongue during the day with a device (https://doi.org/10.3390/smh12671621) have improved snoring in some individuals.    Remember to Drive Safe... Drive Alive     Sleep health profoundly affects your health, mood, and your safety.  Thirty three percent of the population (one in three of us) is not getting enough sleep and many have a sleep disorder. Not getting  enough sleep or having an untreated / undertreated sleep condition may make us sleepy without even knowing it. In fact, our driving could be dramatically impaired due to our sleep health. As your provider, here are some things I would like you to know about driving:     Here are some warning signs for impairment and dangerous drowsy driving:              -Having been awake more than 16 hours               -Looking tired               -Eyelid drooping              -Head nodding (it could be too late at this point)              -Driving for more than 30 minutes     Some things you could do to make the driving safer if you are experiencing some drowsiness:              -Stop driving and rest              -Call for transportation              -Make sure your sleep disorder is adequately treated     Some things that have been shown NOT to work when experiencing drowsiness while driving:              -Turning on the radio              -Opening windows              -Eating any  distracting  /  entertaining  foods (e.g., sunflower seeds, candy, or any other)              -Talking on the phone      Your decision may not only impact your life, but also the life of others. Please, remember to drive safe for yourself and all of us.

## 2023-09-05 NOTE — PROGRESS NOTES
Virtual Visit Details    Type of service:  Video Visit   Video Start Time: 11:00 AM  Video End Time:11:23 AM    Originating Location (pt. Location): Home    Distant Location (provider location):  On-site  Platform used for Video Visit: [facility]    Outpatient Sleep Medicine Consultation:      Name: Yane Hamlin MRN# [id number]   Age: 62 year old YOB: 1960     Date of Consultation: September 5, 2023  Consultation is requested by: No referring provider defined for this encounter. No ref. provider found  Primary care provider: Emily Vilchis PA       Reason for Sleep Consult:       Patient s Reason for visit  Yane Hamlin main reason for visit:  concerned about sleep apnea,   Patient states problem(s) started:  many years  Yane Hamlin's goals for this visit:  treatment.            Assessment and Plan:     Summary Sleep Diagnoses:  Mild obstructive sleep apnea, currently not treated-  She presents with snoring, witnessed apnea, non-refreshing sleep and daytime fatigue and sleepiness. Co-morbid HTN.   We reviewed options.   Recommend Polysomnogram (using 3% desaturation/ AASM 1A. scoring rules) to evaluate current severity of obstructive sleep apnea and initiate treatment.       Summary Recommendations:  Orders Placed This Encounter   Procedures    Comprehensive Sleep Study         Summary Counseling:    Sleep Testing Reviewed  Obstructive Sleep Apnea Reviewed  Complications of Untreated Sleep Apnea Reviewed      Medical Decision-making:   Educational materials provided in instructions    Total time spent reviewing medical records, history and physical examination, review of previous testing and interpretation as well as documentation on this date:45 minutes           History of Present Illness:     Past Sleep Evaluations:  Sleep study done at [facility] on 12/13/2017 (152#)-AHI 8, RDI 9, lowest oxygen saturation was 78%, CPAP -cm/H20. Presenting concerns of snoring, witnessed apnea and excessive daytime  sleepiness.     She states that she did not receive the results of her sleep study.     Weight now is 157 lb    SLEEP-WAKE SCHEDULE:     Work/School Days: Patient goes to school/work:  yes  Usually gets into bed at  11 pm  Takes patient about  5 minutes to fall asleep  Has trouble falling asleep   0-1 nights per week  Wakes up in the middle of the night   1-2 times.  Wakes up due to  bathroom  She has trouble falling back asleep   times a week.   It usually takes  1-2 minutes to get back to sleep  Patient is usually up at  6:30 am  Uses alarm:  yes    Weekends/Non-work Days/All Other Days:  Usually gets into bed at  11 pm  Takes patient about   5 minutes to fall asleep  Patient is usually up at  9 am  Uses alarm:  no    Sleep Need  Patient gets   6-7.5 sleep on average . She does not feel rested.   Patient thinks she needs about  8+ sleep    Yane Hamlin prefers to sleep in this position(s):   stomach  Patient states they do the following activities in bed:      Naps  Patient takes a purposeful nap  0 times a week and naps are usually   in duration  She feels better after a nap:    She dozes off unintentionally   0 days per week  Patient has had a driving accident or near-miss due to sleepiness/drowsiness:  no      SLEEP DISRUPTIONS:    Breathing/Snoring  Patient snores: yes  Other people complain about her snoring:  yes  Patient has been told she stops breathing in her sleep: yes  She has issues with the following:      Movement:  Patient gets pain, discomfort, with an urge to move:   no  It happens when she is resting:     It happens more at night:     Patient has been told she kicks her legs at night:   no     Behaviors in Sleep:  Yane Harleys has experienced the following behaviors while sleeping:    She has experienced sudden muscle weakness during the day:  no      Is there anything else you would like your sleep provider to know:        CAFFEINE AND OTHER SUBSTANCES:    Patient consumes caffeinated  beverages per day:   2  Last caffeine use is usually:  am  List of any prescribed or over the counter stimulants that patient takes:    List of any prescribed or over the counter sleep medication patient takes:    List of previous sleep medications that patient has tried:  no  Patient drinks alcohol to help them sleep:  no  Patient drinks alcohol near bedtime:  no    Family History:  Patient has a family member been diagnosed with a sleep disorder:  all family with sleep apnea            SCALES:    EPWORTH SLEEPINESS SCALE         9/5/2023    10:00 AM    [place] Sleepiness Scale ( [facility]LE [place]  [id number]<br>ESS - [place]/English - Final version - 21 Nov 07 - [facility] [place].)   [place] Score (Sleep) 2         INSOMNIA SEVERITY INDEX (DONALDO)          9/5/2023    10:00 AM   Insomnia Severity Index (DONALDO)   Difficulty falling asleep 2   Difficulty staying asleep 0   Problems waking up too early 3   How SATISFIED/DISSATISFIED are you with your CURRENT sleep pattern? 2   How NOTICEABLE to others do you think your sleep problem is in terms of impairing the quality of your life? 1   How WORRIED/DISTRESSED are you about your current sleep problem? 2   To what extent do you consider your sleep problem to INTERFERE with your daily functioning (e.g. daytime fatigue, mood, ability to function at work/daily chores, concentration, memory, mood, etc.) CURRENTLY? 1   DONALDO Total Score 11       Guidelines for Scoring/Interpretation:  Total score categories:  0-7 = No clinically significant insomnia   8-14 = Subthreshold insomnia   15-21 = Clinical insomnia (moderate severity)  22-28 = Clinical insomnia (severe)  Used via courtesy of www.[facility] Center[facility]era[facility] Technologiest[facility].va.gov with permission from Oseas Gonzalez PhD., [facility]      STOP BANG         7/28/2020     7:20 PM   STOP BANG Questionnaire (  2008, the American Society of Anesthesiologists, Inc. [employer]   [facility]: 162/90         GAD7         No data to display  "                 CAGE-AID         No data to display                CAGE-AID reprinted with permission from the [place] Medical Journal, BURT Pollack. and HETAL Silva, \"Conjoint screening questionnaires for alcohol and drug abuse\" [place] Medical Journal 94: 135-140, 1995.      PATIENT HEALTH QUESTIONNAIRE-9 (PHQ - 9)         No data to display                Developed by Arlet Banegas, Kelsey Kirkpatrick, Yaniv Rasmussen and colleagues, with an educational gavin from [employer] No permission required to reproduce, translate, display or distribute.        Allergies:    No Known Allergies    Medications:    Current Outpatient Medications   Medication Sig Dispense Refill    candesartan (ATACAND) 16 MG tablet Take 16 mg by mouth daily      sertraline (ZOLOFT) 50 MG tablet Take 50 mg by mouth      hydrochlorothiazide (HYDRODIURIL) 12.5 MG tablet Take 1 tablet (12.5 mg) by mouth daily (Patient not taking: Reported on 9/5/2023) 20 tablet 0       Problem List:  Patient Active Problem List    Diagnosis Date Noted    HTN (hypertension) 09/05/2023     Priority: Medium    Anxiety 09/05/2023     Priority: Medium    LEV (obstructive sleep apnea) 09/04/2023     Priority: Medium     Sleep study done at [facility] on 12/13/2017 (152#)-AHI 8, RDI 9,  lowest oxygen saturation was 78%, CPAP -cm/H20           Past Medical/Surgical History:  Past Medical History:   Diagnosis Date    Hypertension      No past surgical history on file.    Social History:  Social History     Socioeconomic History    Marital status:      Spouse name: Not on file    Number of children: Not on file    Years of education: Not on file    Highest education level: Not on file   Occupational History    Occupation: Cleaning   Tobacco Use    Smoking status: Never    Smokeless tobacco: Never   Substance and Sexual Activity    Alcohol use: Not on file    Drug use: Not on file    Sexual activity: Not on file   Other Topics Concern    Not on file   Social " History Narrative    Not on file     Social Determinants of Health     Financial Resource Strain: Not on file   Food Insecurity: Not on file   Transportation Needs: Not on file   Physical Activity: Not on file   Stress: Not on file   Social Connections: Not on file   Intimate Partner Violence: Not on file   Housing Stability: Not on file       Family History:  No family history on file.    Review of Systems:  A complete review of systems reviewed by me is negative with the exeption of what has been mentioned in the history of present illness.        Physical Examination:  Vitals: There were no vitals taken for this visit.  BMI= There is no height or weight on file to calculate BMI.           GENERAL APPEARANCE: alert and no distress  EYES: Eyes grossly normal to inspection  HENT: oropharynx crowded  NECK: no asymmetry, masses, or scars  RESP: breathing is non-labored  NEURO: mentation intact and speech normal  PSYCH: affect normal/bright  Mallampati Class: III.  Tonsillar Stage:          Data: All pertinent previous laboratory data reviewed     Recent Labs   Lab Test 07/28/20 1914      POTASSIUM 3.9   CHLORIDE 101   CO2 28   ANIONGAP 13   *   BUN 13   CR 0.90   AMELIA 9.6       Recent Labs   Lab Test 07/28/20 1914   WBC 9.9   RBC 4.48   HGB 13.6   HCT 41.3   MCV 92   MCH 30.4   MCHC 32.9   RDW 12.5        SpO2 Readings from Last 4 Encounters:   07/28/20 94%   07/21/19 96%          Chiquis Leija PA-C 9/5/2023

## 2023-09-13 ENCOUNTER — TELEPHONE (OUTPATIENT)
Dept: PULMONOLOGY | Facility: CLINIC | Age: 63
End: 2023-09-13

## 2023-09-13 ENCOUNTER — OFFICE VISIT (OUTPATIENT)
Dept: PULMONOLOGY | Facility: CLINIC | Age: 63
End: 2023-09-13
Attending: PHYSICIAN ASSISTANT
Payer: COMMERCIAL

## 2023-09-13 ENCOUNTER — PRE VISIT (OUTPATIENT)
Dept: PULMONOLOGY | Facility: CLINIC | Age: 63
End: 2023-09-13

## 2023-09-13 ENCOUNTER — OFFICE VISIT (OUTPATIENT)
Dept: PULMONOLOGY | Facility: CLINIC | Age: 63
End: 2023-09-13
Payer: COMMERCIAL

## 2023-09-13 VITALS
RESPIRATION RATE: 17 BRPM | WEIGHT: 153.4 LBS | BODY MASS INDEX: 27.18 KG/M2 | HEIGHT: 63 IN | HEART RATE: 77 BPM | DIASTOLIC BLOOD PRESSURE: 74 MMHG | SYSTOLIC BLOOD PRESSURE: 136 MMHG | OXYGEN SATURATION: 96 %

## 2023-09-13 DIAGNOSIS — R93.89 ABNORMAL CHEST CT: ICD-10-CM

## 2023-09-13 DIAGNOSIS — R93.89 ABNORMAL CT OF THE CHEST: ICD-10-CM

## 2023-09-13 PROCEDURE — 94060 EVALUATION OF WHEEZING: CPT | Performed by: INTERNAL MEDICINE

## 2023-09-13 PROCEDURE — 94729 DIFFUSING CAPACITY: CPT | Performed by: INTERNAL MEDICINE

## 2023-09-13 PROCEDURE — 99205 OFFICE O/P NEW HI 60 MIN: CPT | Mod: 25 | Performed by: STUDENT IN AN ORGANIZED HEALTH CARE EDUCATION/TRAINING PROGRAM

## 2023-09-13 PROCEDURE — 94726 PLETHYSMOGRAPHY LUNG VOLUMES: CPT | Performed by: INTERNAL MEDICINE

## 2023-09-13 PROCEDURE — G0463 HOSPITAL OUTPT CLINIC VISIT: HCPCS | Performed by: STUDENT IN AN ORGANIZED HEALTH CARE EDUCATION/TRAINING PROGRAM

## 2023-09-13 ASSESSMENT — PAIN SCALES - GENERAL: PAINLEVEL: NO PAIN (0)

## 2023-09-13 NOTE — PROGRESS NOTES
Reason for Visit  Yane Hamlin is a 62 year old year old female who is being seen for Consult (New Abnormal CT of chest )    Pulmonary HPI    Yane Hamlin presented to Pulmonary clinic for her first appointment. She was referred to pulmonary clinic for incidental findings on her chest CT scan back in May of this year.    She reports going to the Emergency Department on 5/7/23 for high blood pressure, tachycardia, left-sided chest pain, and a feeling of doom and gloom. She thought she was having a heart attack. Had ECG, chest xray, CT scan. She was told she was having a panic attack, and was sent home with cardiology follow up. Since then, she had calcium scoring performed of her coronaries, which revealed no calcification. Her symptoms have largely resolved since the ED visit, but she does occasionally still feel anxious.     She has no pulmonary disease or any family history of pulmonary disease. She denies dyspnea with exertion, cough, sinus congestion, or URI symptoms. She is able to walk and go up stairs without difficulty. She has never been diagnosed with pulmonary disease and has never needed inhalers. She is a never smoker. Her ex- smoked, and she lived with him for about 12 years, but this was more than 20 years ago. No history of lung infection (bacterial, viral, or fungal/tuberculosis).     Has a history of acid reflux. This is intermittent with mild burning pain in the upper stomach, and she takes bayleaf tea which treats her symptoms completely. She reports waking up in the morning with acid taste in her mouth.    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   Smoking history: none, no inhalation of any products, 12 years of secondhand smoke from ex-  Family history: no lung history  - Mom side: colon  - Dad: cardiovascular disease (age 74)  Pets: none currently, had a dog at one point  Bird exposures: parakeet (no currently, had bird for 4-5  years), parrot (not currently, had bird for 2 years)  Pillows: down bedding/with feathers x18 years  Prior infections: no history of COVID, no hx of influenza    PMHx:  - Hypertension (takes candesartan)  - Anxiety (takes sertraline)    Other medications: no herbal supplements     ROS:   - No night sweats  - No weight changes  - No major skin changes or rashes    CT chest performed 5/7/23: focal bronchiectasis and mucous plugging of the RLL. In addition, a 17x10 mm tubular and branching nodular opacity in RLL. Does not appear to have prior imaging studies to compare.     Pulmonary function testing (9/13/23): normal, including FEV1, DLCO, and FEV1/FVC ratio. Loop volume curves with possible evidence of obstructive pattern.    Current Outpatient Medications   Medication    candesartan (ATACAND) 16 MG tablet    hydrochlorothiazide (HYDRODIURIL) 12.5 MG tablet    sertraline (ZOLOFT) 50 MG tablet     No current facility-administered medications for this visit.     No Known Allergies  Past Medical History:   Diagnosis Date    Hypertension        History reviewed. No pertinent surgical history.    Social History     Socioeconomic History    Marital status:      Spouse name: Not on file    Number of children: Not on file    Years of education: Not on file    Highest education level: Not on file   Occupational History    Occupation: Cleaning   Tobacco Use    Smoking status: Never    Smokeless tobacco: Never   Substance and Sexual Activity    Alcohol use: Not on file    Drug use: Not on file    Sexual activity: Not on file   Other Topics Concern    Not on file   Social History Narrative    Not on file     Social Determinants of Health     Financial Resource Strain: Not on file   Food Insecurity: Not on file   Transportation Needs: Not on file   Physical Activity: Not on file   Stress: Not on file   Social Connections: Not on file   Intimate Partner Violence: Not on file   Housing Stability: Not on file       ROS  "Pulmonary  A complete ROS was otherwise negative except as noted in the HPI.  Ht 1.588 m (5' 2.5\")   Wt 69.6 kg (153 lb 6.4 oz)   BMI 27.61 kg/m    Exam:   GENERAL APPEARANCE: Well developed, well nourished, alert, and in no apparent distress.  EYES: Normal sclera  HENT: Nasal mucosa with no edema and no hyperemia. No nasal polyps.  EARS: Canals clear, TMs normal  MOUTH: Oral mucosa is moist, without any lesions, no tonsillar enlargement, no oropharyngeal exudate.  NECK: supple, no masses, no thyromegaly.  LYMPHATICS: No significant axillary, cervical, or supraclavicular nodes.  RESP: normal percussion, good air flow throughout.  No crackles. No rhonchi. No wheezes.  CV: Normal S1, S2, regular rhythm, normal rate. No murmur.  No rub. No gallop. No LE edema.   MS: extremities normal. No clubbing. No cyanosis.  SKIN: no rash on limited exam  NEURO: Mentation intact, speech normal, normal strength and tone, normal gait and stance  PSYCH: mentation appears normal. and affect normal/bright  Results:  Recent Results (from the past 168 hour(s))   General PFT Lab (Please always keep checked)    Collection Time: 09/13/23 12:12 PM   Result Value Ref Range    FVC-Pred 2.66 L    FVC-Pre 2.12 L    FVC-%Pred-Pre 79 %    FEV1-Pre 1.60 L    FEV1-%Pred-Pre 75 %    FEV1FVC-Pred 80 %    FEV1FVC-Pre 75 %    FEFMax-Pred 5.84 L/sec    FEFMax-Pre 5.31 L/sec    FEFMax-%Pred-Pre 90 %    FEF2575-Pred 1.94 L/sec    FEF2575-Pre 1.30 L/sec    GXI8697-%Pred-Pre 67 %    FEF2575-Post 1.29 L/sec    REO7192-%Pred-Post 66 %    ExpTime-Pre 6.61 sec    FIFMax-Pre 4.47 L/sec    VC-Pred 3.10 L    VC-Pre 2.33 L    VC-%Pred-Pre 75 %    IC-Pred 2.28 L    IC-Pre 2.14 L    IC-%Pred-Pre 94 %    ERV-Pred 0.74 L    ERV-Pre 0.19 L    ERV-%Pred-Pre 25 %    FEV1FEV6-Pred 80 %    FEV1FEV6-Pre 78 %    FRCPleth-Pred 2.57 L    FRCPleth-Pre 2.43 L    FRCPleth-%Pred-Pre 94 %    RVPleth-Pred 1.71 L    RVPleth-Pre 2.24 L    RVPleth-%Pred-Pre 130 %    TLCPleth-Pred 4.80 L "    TLCPleth-Pre 4.57 L    TLCPleth-%Pred-Pre 95 %    DLCOunc-Pred 18.72 ml/min/mmHg    DLCOunc-Pre 22.60 ml/min/mmHg    DLCOunc-%Pred-Pre 120 %    VA-Pre 3.84 L    VA-%Pred-Pre 87 %    FEV1SVC-Pred 68 %    FEV1SVC-Pre 68 %       Assessment and plan:   #Solitary lung nodule  17x10 mm tubular and branching nodular opacity in RLL. No prior imaging for comparison. On imaging, the nodule does appear more solid. She is low risk (no personal or family history of lung cancer, and no smoking history). Per Fleischner guidelines, she is due for follow up imaging (3 months) for re-evaluation.   - Repeat CT chest in the next 1 month  #Bronchiectasis and mucus plugging in RLL  Diagnosed with CT scan performed in May 2023. Incidental finding found with Emergency Department cardiology work up. She has no pulmonary symptoms or pulmonary history. She has symptoms consistent with GERD (including acid taste in the AM, and intermittent burning abdominal pain with some foods). These lung findings are possibly related to aspiration from acid reflux, vs prior lung infection (which patient is not aware of). Will re-assess with repeat imaging as stated above. Reassuring that patient has no respiratory symptoms at this time.  Pt seen and discussed with Dr. Red, Pulmonary Staff.   Radhika Phillips MD  PGY3 Internal Medicine

## 2023-09-13 NOTE — LETTER
9/13/2023         RE: Yane Hamlin  [address]  [address]  [facility] [address]        Dear Colleague,    Thank you for referring your patient, Yane Hamlin, to the [facility] [place]. Please see a copy of my visit note below.    Reason for Visit  Yane Hamlin is a 62 year old year old female who is being seen for Consult (New Abnormal CT of chest )    Pulmonary HPI    Yane Hamlin presented to Pulmonary clinic for her first appointment. She was referred to pulmonary clinic for incidental findings on her chest CT scan back in May of this year.    She reports going to the Emergency Department on 5/7/23 for high blood pressure, tachycardia, left-sided chest pain, and a feeling of doom and gloom. She thought she was having a heart attack. Had ECG, chest xray, CT scan. She was told she was having a panic attack, and was sent home with cardiology follow up. Since then, she had calcium scoring performed of her coronaries, which revealed no calcification. Her symptoms have largely resolved since the ED visit, but she does occasionally still feel anxious.     She has no pulmonary disease or any family history of pulmonary disease. She denies dyspnea with exertion, cough, sinus congestion, or URI symptoms. She is able to walk and go up stairs without difficulty. She has never been diagnosed with pulmonary disease and has never needed inhalers. She is a never smoker. Her ex- smoked, and she lived with him for about 12 years, but this was more than 20 years ago. No history of lung infection (bacterial, viral, or fungal/tuberculosis).     Has a history of acid reflux. This is intermittent with mild burning pain in the upper stomach, and she takes bayleaf tea which treats her symptoms completely. She reports waking up in the morning with acid taste in her mouth.    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 Uses [employer] [address] [employer]    Smoking history: none, no inhalation of any products, 12 years of secondhand smoke from ex-  Family history: no lung history  - Mom side: colon  - Dad: cardiovascular disease (age 74)  Pets: none currently, had a dog at one point  Bird exposures: parakeet (no currently, had bird for 4-5 years), parrot (not currently, had bird for 2 years)  Pillows: down bedding/with feathers x18 years  Prior infections: no history of COVID, no hx of influenza    PMHx:  - Hypertension (takes candesartan)  - Anxiety (takes sertraline)    Other medications: no herbal supplements     ROS:   - No night sweats  - No weight changes  - No major skin changes or rashes    CT chest performed 5/7/23: focal bronchiectasis and mucous plugging of the RLL. In addition, a 17x10 mm tubular and branching nodular opacity in RLL. Does not appear to have prior imaging studies to compare.     Pulmonary function testing (9/13/23): normal, including FEV1, DLCO, and FEV1/FVC ratio. Loop volume curves with possible evidence of obstructive pattern.    Current Outpatient Medications   Medication    candesartan (ATACAND) 16 MG tablet    hydrochlorothiazide (HYDRODIURIL) 12.5 MG tablet    sertraline (ZOLOFT) 50 MG tablet     No current facility-administered medications for this visit.     No Known Allergies  Past Medical History:   Diagnosis Date    Hypertension        History reviewed. No pertinent surgical history.    Social History     Socioeconomic History    Marital status:      Spouse name: Not on file    Number of children: Not on file    Years of education: Not on file    Highest education level: Not on file   Occupational History    Occupation: Cleaning   Tobacco Use    Smoking status: Never    Smokeless tobacco: Never   Substance and Sexual Activity    Alcohol use: Not on file    Drug use: Not on file    Sexual activity: Not on file   Other Topics Concern    Not on file   Social History Narrative    Not on file     Social  "Determinants of Health     Financial Resource Strain: Not on file   Food Insecurity: Not on file   Transportation Needs: Not on file   Physical Activity: Not on file   Stress: Not on file   Social Connections: Not on file   Intimate Partner Violence: Not on file   Housing Stability: Not on file       ROS Pulmonary  A complete ROS was otherwise negative except as noted in the HPI.  Ht 1.588 m (5' 2.5\")   Wt 69.6 kg (153 lb 6.4 oz)   BMI 27.61 kg/m    Exam:   GENERAL APPEARANCE: Well developed, well nourished, alert, and in no apparent distress.  EYES: Normal sclera  HENT: Nasal mucosa with no edema and no hyperemia. No nasal polyps.  EARS: Canals clear, TMs normal  MOUTH: Oral mucosa is moist, without any lesions, no tonsillar enlargement, no oropharyngeal exudate.  NECK: supple, no masses, no thyromegaly.  LYMPHATICS: No significant axillary, cervical, or supraclavicular nodes.  RESP: normal percussion, good air flow throughout.  No crackles. No rhonchi. No wheezes.  CV: Normal S1, S2, regular rhythm, normal rate. No murmur.  No rub. No gallop. No LE edema.   MS: extremities normal. No clubbing. No cyanosis.  SKIN: no rash on limited exam  NEURO: Mentation intact, speech normal, normal strength and tone, normal gait and stance  PSYCH: mentation appears normal. and affect normal/bright  Results:  Recent Results (from the past 168 hour(s))   General PFT Lab (Please always keep checked)    Collection Time: 09/13/23 12:12 PM   Result Value Ref Range    FVC-Pred 2.66 L    FVC-Pre 2.12 L    FVC-%Pred-Pre 79 %    FEV1-Pre 1.60 L    FEV1-%Pred-Pre 75 %    FEV1FVC-Pred 80 %    FEV1FVC-Pre 75 %    FEFMax-Pred 5.84 L/sec    FEFMax-Pre 5.31 L/sec    FEFMax-%Pred-Pre 90 %    FEF2575-Pred 1.94 L/sec    FEF2575-Pre 1.30 L/sec    LRV0484-%Pred-Pre 67 %    FEF2575-Post 1.29 L/sec    DMN4417-%Pred-Post 66 %    ExpTime-Pre 6.61 sec    FIFMax-Pre 4.47 L/sec    VC-Pred 3.10 L    VC-Pre 2.33 L    VC-%Pred-Pre 75 %    IC-Pred 2.28 L    " IC-Pre 2.14 L    IC-%Pred-Pre 94 %    ERV-Pred 0.74 L    ERV-Pre 0.19 L    ERV-%Pred-Pre 25 %    FEV1FEV6-Pred 80 %    FEV1FEV6-Pre 78 %    FRCPleth-Pred 2.57 L    FRCPleth-Pre 2.43 L    FRCPleth-%Pred-Pre 94 %    RVPleth-Pred 1.71 L    RVPleth-Pre 2.24 L    RVPleth-%Pred-Pre 130 %    TLCPleth-Pred 4.80 L    TLCPleth-Pre 4.57 L    TLCPleth-%Pred-Pre 95 %    DLCOunc-Pred 18.72 ml/min/mmHg    DLCOunc-Pre 22.60 ml/min/mmHg    DLCOunc-%Pred-Pre 120 %    VA-Pre 3.84 L    VA-%Pred-Pre 87 %    FEV1SVC-Pred 68 %    FEV1SVC-Pre 68 %       Assessment and plan:   #Solitary lung nodule  17x10 mm tubular and branching nodular opacity in RLL. No prior imaging for comparison. On imaging, the nodule does appear more solid. She is low risk (no personal or family history of lung cancer, and no smoking history). Per Fleischner guidelines, she is due for follow up imaging (3 months) for re-evaluation.   - Repeat CT chest in the next 1 month  #Bronchiectasis and mucus plugging in RLL  Diagnosed with CT scan performed in May 2023. Incidental finding found with Emergency Department cardiology work up. She has no pulmonary symptoms or pulmonary history. She has symptoms consistent with GERD (including acid taste in the AM, and intermittent burning abdominal pain with some foods). These lung findings are possibly related to aspiration from acid reflux, vs prior lung infection (which patient is not aware of). Will re-assess with repeat imaging as stated above. Reassuring that patient has no respiratory symptoms at this time.  Pt seen and discussed with Dr. Red, Pulmonary Staff.   Radhika Phillips MD  PGY3 Internal Medicine              Attestation with edits by Gael Red MD at 9/13/2023 11:36 PM:  Physician Attestation  I, Gael Red MD, saw this patient with the resident and agree with the findings and plan of care as documented in the note.      I personally reviewed: vitals, labs, imaging , PFTs, and examined the patient.        Summary:  Ms. Hamlin is a 62-year-old female with over a decade of secondhand smoke exposure from ex-, with acid reflux, and no prior diagnosis of pulmonary disease who was found incidentally to have abnormal thoracic imaging with evidence of lower lobe predominant bronchiectasis and mucous plugging in addition to a right lower lobe branching nodular opacity.    Has patient's pulmonary function tests at the moment are normal and patient denies any respiratory symptoms whatsoever, for the interim will order CT scan to follow-up nodularities given size of lesions, at no point of prior comparison, and increased risk in the setting of smoke exposure.      I spent a total of 60 minutes on the day of the encounter dedicated to the office visit with Yane Hamlin.    This included review of vitals, labs, imaging, other diagnostic tests (I.e. PFTs, ECHO), face-to-face interaction, physical exam, counseling the patient +/- family members, and/or coordinating care.    Gael Red MD

## 2023-09-13 NOTE — NURSING NOTE
Chief Complaint   Patient presents with    Consult     New Abnormal CT of chest     Medications reviewed and vital signs taken.   Preston Jones CMA

## 2023-09-14 LAB
DLCOUNC-%PRED-PRE: 120 %
DLCOUNC-PRE: 22.6 ML/MIN/MMHG
DLCOUNC-PRED: 18.72 ML/MIN/MMHG
ERV-%PRED-PRE: 25 %
ERV-PRE: 0.19 L
ERV-PRED: 0.74 L
EXPTIME-PRE: 6.61 SEC
FEF2575-%PRED-POST: 66 %
FEF2575-%PRED-PRE: 67 %
FEF2575-POST: 1.29 L/SEC
FEF2575-PRE: 1.3 L/SEC
FEF2575-PRED: 1.94 L/SEC
FEFMAX-%PRED-PRE: 90 %
FEFMAX-PRE: 5.31 L/SEC
FEFMAX-PRED: 5.84 L/SEC
FEV1-%PRED-PRE: 75 %
FEV1-PRE: 1.6 L
FEV1FEV6-PRE: 78 %
FEV1FEV6-PRED: 80 %
FEV1FVC-PRE: 75 %
FEV1FVC-PRED: 80 %
FEV1SVC-PRE: 68 %
FEV1SVC-PRED: 68 %
FIFMAX-PRE: 4.47 L/SEC
FRCPLETH-%PRED-PRE: 94 %
FRCPLETH-PRE: 2.43 L
FRCPLETH-PRED: 2.57 L
FVC-%PRED-PRE: 79 %
FVC-PRE: 2.12 L
FVC-PRED: 2.66 L
IC-%PRED-PRE: 94 %
IC-PRE: 2.14 L
IC-PRED: 2.28 L
RVPLETH-%PRED-PRE: 130 %
RVPLETH-PRE: 2.24 L
RVPLETH-PRED: 1.71 L
TLCPLETH-%PRED-PRE: 95 %
TLCPLETH-PRE: 4.57 L
TLCPLETH-PRED: 4.8 L
VA-%PRED-PRE: 87 %
VA-PRE: 3.84 L
VC-%PRED-PRE: 75 %
VC-PRE: 2.33 L
VC-PRED: 3.1 L

## 2023-09-20 NOTE — RESULT ENCOUNTER NOTE
Results discussed directly with patient while patient was present. Any further details documented in the note.   Gael Red MD

## 2023-09-27 ENCOUNTER — TELEPHONE (OUTPATIENT)
Dept: PULMONOLOGY | Facility: CLINIC | Age: 63
End: 2023-09-27
Payer: COMMERCIAL

## 2023-09-27 NOTE — TELEPHONE ENCOUNTER
RN called and left VM for patient regarding testings needed prior to Pulm appointment. RN informed that patient only has a Chest CT scheduled on 10/4 at 6pm. If further testing is ordered, RN will notify patient.     -HALLIE Sanchez

## 2023-10-23 ENCOUNTER — TELEPHONE (OUTPATIENT)
Dept: PULMONOLOGY | Facility: CLINIC | Age: 63
End: 2023-10-23
Payer: COMMERCIAL

## 2023-10-23 NOTE — TELEPHONE ENCOUNTER
Writer returned patient call and left message stating there are orders for a chest CT already placed. She was supposed to have chest CT on 10/4 but she canceled appointment and never called back to reschedule. Writer gave patient scheduling number. Writer forwarded message to clinic coordinators to see if patient can get CT scheduled before appointment with Neda on 10/25

## 2023-10-23 NOTE — TELEPHONE ENCOUNTER
[facility] Call Center    Phone Message    May a detailed message be left on voicemail: yes     Reason for Call: Other: Imaging testing prior to Pulm appt     Pt would like a call back to clarify the imaging test orders needed prior to her pulmonary follow up appointment on 10/25/23.  Please contact the Pt back.      Action Taken: Message routed to:  [facility] Adult Pulm

## 2023-10-23 NOTE — TELEPHONE ENCOUNTER
Patient Contacted for the patient to call back and schedule the following:    Appointment type: CT CHEST  Provider: ANISA  Return date: 10/25/2023  Specialty phone number: [phone]  Additional appointment(s) needed: N/A  Additonal Notes: Pt stated Chest CT would be too much radiation for her, wondering if she can do an MRI instead. Declined to schedule Chest CT.

## 2023-10-24 NOTE — TELEPHONE ENCOUNTER
Pt is calling to see if Dr. Red would order an MRI?  Pt does have an appointment tomorrow with Dr. Red?    Pt wants to know if she will need to reschedule the appointment if she doesn't not complete the imaging test?  Please contact the Pt back.

## 2023-10-25 ENCOUNTER — TELEPHONE (OUTPATIENT)
Dept: PULMONOLOGY | Facility: CLINIC | Age: 63
End: 2023-10-25

## 2023-10-25 NOTE — TELEPHONE ENCOUNTER
Left Voicemail (1st Attempt) for the patient to call back and schedule the following:    Appointment type: YANAUL  Provider: NEDA  Return date: N/A  Specialty phone number: [phone]  Additional appointment(s) needed: N/A  Additonal Notes: Pt refused to do Chest CT because of radiation, needed to cancel appt with Dr. Red, can only reschedule this appt if pt agrees to do Chest CT for Neda to review.

## 2023-10-25 NOTE — CONFIDENTIAL NOTE
Writer called patient and left message relaying message from Dr. Red:  I would have her reschedule because following up the pulmonary nodules with a CT is really the only reason she's coming to see me. So without a CT there isn't anything to address. If she is willing to get the CT then she can see me after it.

## 2023-10-27 ENCOUNTER — TELEPHONE (OUTPATIENT)
Dept: PULMONOLOGY | Facility: CLINIC | Age: 63
End: 2023-10-27
Payer: COMMERCIAL

## 2023-10-27 NOTE — TELEPHONE ENCOUNTER
Patient Contacted for the patient to call back and schedule the following:    Appointment type: MILENA  Provider: ANISA  Return date: 12/27/23  Specialty phone number: [phone]  Additional appointment(s) needed: N/A  Additonal Notes: N/A

## 2023-10-30 ENCOUNTER — HOSPITAL ENCOUNTER (OUTPATIENT)
Dept: CT IMAGING | Facility: CLINIC | Age: 63
Discharge: HOME | End: 2023-10-30
Attending: STUDENT IN AN ORGANIZED HEALTH CARE EDUCATION/TRAINING PROGRAM | Admitting: STUDENT IN AN ORGANIZED HEALTH CARE EDUCATION/TRAINING PROGRAM
Payer: COMMERCIAL

## 2023-10-30 DIAGNOSIS — R93.89 ABNORMAL CT OF THE CHEST: ICD-10-CM

## 2023-10-30 PROCEDURE — 71250 CT THORAX DX C-: CPT

## 2023-12-27 ENCOUNTER — OFFICE VISIT (OUTPATIENT)
Dept: PULMONOLOGY | Facility: CLINIC | Age: 63
End: 2023-12-27
Attending: STUDENT IN AN ORGANIZED HEALTH CARE EDUCATION/TRAINING PROGRAM
Payer: COMMERCIAL

## 2023-12-27 VITALS — HEART RATE: 96 BPM | DIASTOLIC BLOOD PRESSURE: 81 MMHG | OXYGEN SATURATION: 97 % | SYSTOLIC BLOOD PRESSURE: 161 MMHG

## 2023-12-27 DIAGNOSIS — J47.9 BRONCHIECTASIS WITHOUT COMPLICATION (H): ICD-10-CM

## 2023-12-27 DIAGNOSIS — R93.89 ABNORMAL CT OF THE CHEST: Primary | ICD-10-CM

## 2023-12-27 PROCEDURE — G0463 HOSPITAL OUTPT CLINIC VISIT: HCPCS | Performed by: STUDENT IN AN ORGANIZED HEALTH CARE EDUCATION/TRAINING PROGRAM

## 2023-12-27 PROCEDURE — 99215 OFFICE O/P EST HI 40 MIN: CPT | Performed by: STUDENT IN AN ORGANIZED HEALTH CARE EDUCATION/TRAINING PROGRAM

## 2023-12-27 ASSESSMENT — PAIN SCALES - GENERAL: PAINLEVEL: NO PAIN (0)

## 2023-12-27 NOTE — NURSING NOTE
Chief Complaint   Patient presents with    Follow Up     Return appt      Vitals were taken and medications were reconciled.     Bell Galeano RMA  8:11 AM

## 2023-12-27 NOTE — PROGRESS NOTES
Pulmonology Clinic Appointment     Yane Hamlin MRN# [id number]   Age: 63 year old YOB: 1960         Reason for consult:    Yane Hamlin is a 63 year old year old female who is being seen for Follow Up (Return appt )        Requesting physician:              Chief Complaint:   Abnormal thoracic imaging     History is obtained from the patient         History of Present Illness:   Yane Hamlin  is a 63 year old year old female with hypertension, anxiety, and substantial history of secondhand smoke exposure initially referred for thoracic imaging abnormalities returning for follow-up visit.    Patient initially seen with resident on September 13, 2023.  During that visit was established the patient had symptoms consistent with GERD, and no prior diagnosis of pulmonary disease.  She had CT imaging which showed right lower lobe bronchiectasis and mucous plugging with a small right lower lobe branching nodular opacity.  Her pulmonary function tests were normal without any obstruction or restriction, and she denied any respiratory symptoms whatsoever.  The CT was initially conducted in May 2023 in the emergency department as she had presented with high blood pressure, tachycardia, left-sided chest pain.  Their evaluation suggested that she may have been having a panic attack as cardiac workup did not suggest any coronary etiology of her symptoms.    She denies any smoking history though has had over 12 years of secondhand smoke from her ex- who smoked indoors.  Occupation: She cleans homes for work, using a variety of cleaning products, but denies respiratory symptoms when using these products or any episode of acute reaction to such products.  She usually does not wear a mask.    She does not currently have any pets, once had a dog. While living in [place] 30 years ago she had a parakeet for 4 to 5 years and a parrot for 2 years.  She uses down pillows.  Denies any recalled severe respiratory  infection such as bacterial pneumonia.  Denied night sweats, weight changes.    The CT showed a 1.7 cm x 1 cm tubular branching nodular opacity in the right lower lobe without any point of comparison prior.  I had recommended repeat imaging, and initially patient was reluctant to pursue.  After initially agreeing during her appointment, she did not schedule the CT in a timely manner, called indicating she was concerned for the radiation associated with a CT and asked whether an MRI could be done in place.  I reassured her that MRI was not the appropriate imaging modality in order to investigate, and she eventually agreed to undergo repeat CT scan.  On the repeat scan the opacity appears much less densely consolidated/confluent and and smaller in size.  No new nodular opacities noted (see images below).    May 2023      October 2023        She continues to deny any respiratory symptoms, denies SOB, cough, chest pain.            Past Medical History:     Past Medical History:   Diagnosis Date    Hypertension             Past Surgical History:   No past surgical history on file.         Social History:     Social History     Socioeconomic History    Marital status:      Spouse name: Not on file    Number of children: Not on file    Years of education: Not on file    Highest education level: Not on file   Occupational History    Occupation: Cleaning   Tobacco Use    Smoking status: Never    Smokeless tobacco: Never   Substance and Sexual Activity    Alcohol use: Not on file    Drug use: Not on file    Sexual activity: Not on file   Other Topics Concern    Not on file   Social History Narrative    Not on file     Social Determinants of Health     Financial Resource Strain: Not on file   Food Insecurity: Not on file   Transportation Needs: Not on file   Physical Activity: Not on file   Stress: Not on file   Social Connections: Not on file   Interpersonal Safety: Not on file   Housing Stability: Not on file              Family History:   No family history on file.        Immunizations:     There is no immunization history on file for this patient.       Allergies:   No Known Allergies       Medications:     Current Outpatient Medications   Medication    candesartan (ATACAND) 16 MG tablet    sertraline (ZOLOFT) 50 MG tablet     No current facility-administered medications for this visit.          Review of Systems:   The Review of Systems is negative other than noted in the HPI         Physical Exam:   There were no vitals taken for this visit.  Patient declined being weighed.     GENERAL APPEARANCE:  alert, and in no apparent distress.  EYES: PERRL, EOMI  HENT: Nasal mucosa with no edema and no hyperemia. No nasal polyps.  MOUTH: Oral mucosa is moist, without any lesions, no tonsillar enlargement, no oropharyngeal exudate.  NECK: supple, no masses, no thyromegaly.  LYMPHATICS: No palpable axillary, cervical, or supraclavicular nodes.  RESP: good air flow throughout.  No crackles. No rhonchi. No wheezes. Normal percussion. Normal chest expansion  CV: Normal S1, S2, regular rhythm, normal rate. No murmur.  No rub. No gallop. No LE edema.   ABDOMEN:  soft, nontender, no HSM or masses. Normal bowel sounds  MS: extremities normal. No clubbing. No cyanosis.  SKIN: no rash on limited exam   NEURO: speech normal, normal strength and tone, normal gait and stance  PSYCH: normal mood and affect         Data:     All cardiac studies reviewed by me.  All imaging studies reviewed by me.    Recent Results (from the past 4368 hour(s))   General PFT Lab (Please always keep checked)    Collection Time: 09/13/23 12:12 PM   Result Value Ref Range    FVC-Pred 2.66 L    FVC-Pre 2.12 L    FVC-%Pred-Pre 79 %    FEV1-Pre 1.60 L    FEV1-%Pred-Pre 75 %    FEV1FVC-Pred 80 %    FEV1FVC-Pre 75 %    FEFMax-Pred 5.84 L/sec    FEFMax-Pre 5.31 L/sec    FEFMax-%Pred-Pre 90 %    FEF2575-Pred 1.94 L/sec    FEF2575-Pre 1.30 L/sec    IRX7618-%Pred-Pre 67 %     FEF2575-Post 1.29 L/sec    RYZ2186-%Pred-Post 66 %    ExpTime-Pre 6.61 sec    FIFMax-Pre 4.47 L/sec    VC-Pred 3.10 L    VC-Pre 2.33 L    VC-%Pred-Pre 75 %    IC-Pred 2.28 L    IC-Pre 2.14 L    IC-%Pred-Pre 94 %    ERV-Pred 0.74 L    ERV-Pre 0.19 L    ERV-%Pred-Pre 25 %    FEV1FEV6-Pred 80 %    FEV1FEV6-Pre 78 %    FRCPleth-Pred 2.57 L    FRCPleth-Pre 2.43 L    FRCPleth-%Pred-Pre 94 %    RVPleth-Pred 1.71 L    RVPleth-Pre 2.24 L    RVPleth-%Pred-Pre 130 %    TLCPleth-Pred 4.80 L    TLCPleth-Pre 4.57 L    TLCPleth-%Pred-Pre 95 %    DLCOunc-Pred 18.72 ml/min/mmHg    DLCOunc-Pre 22.60 ml/min/mmHg    DLCOunc-%Pred-Pre 120 %    VA-Pre 3.84 L    VA-%Pred-Pre 87 %    FEV1SVC-Pred 68 %    FEV1SVC-Pre 68 %                       Assessment and Plan:   Abnormal thoracic imaging  Bronchiectasis  Nodular opacity  No respiratory symptoms, incidentally found abnormality, focal in RLL. This is suspicious for the sequelae of a prior infection or from potential recurrent aspiration given GERD symptoms. Nodular consolidative process that has since improved likely represents mucus plugging or acute findings shortly after aspiration event. As she denies cough or perceptible dysphagia or aspiration we discussed more conservative watchful waiting as opposed to pursuing any further workup. Similarly, would not recommend any further imaging follow up unless patient develops new respiratory or constitutional symptoms.   May follow up prn    I spent a total of 43 minutes on the day of the encounter dedicated to the office visit with Yane Harleys.    This included review of vitals, labs, imaging, other diagnostic tests , face-to-face interaction, physical exam, counseling the patient +/- family members, and/or coordinating care.    Gael Red MD

## 2023-12-27 NOTE — LETTER
12/27/2023         RE: Yane Hamlin  [address]  [address]  [facility] [address]        Dear Colleague,    Thank you for referring your patient, Yane Hamlin, to the [facility] [place]. Please see a copy of my visit note below.      Pulmonology Clinic Appointment     Yane Hamlin MRN# [id number]   Age: 63 year old YOB: 1960         Reason for consult:    Yane Hamlin is a 63 year old year old female who is being seen for Follow Up (Return appt )        Requesting physician:              Chief Complaint:   Abnormal thoracic imaging     History is obtained from the patient         History of Present Illness:   Yane Hamlin  is a 63 year old year old female with hypertension, anxiety, and substantial history of secondhand smoke exposure initially referred for thoracic imaging abnormalities returning for follow-up visit.    Patient initially seen with resident on September 13, 2023.  During that visit was established the patient had symptoms consistent with GERD, and no prior diagnosis of pulmonary disease.  She had CT imaging which showed right lower lobe bronchiectasis and mucous plugging with a small right lower lobe branching nodular opacity.  Her pulmonary function tests were normal without any obstruction or restriction, and she denied any respiratory symptoms whatsoever.  The CT was initially conducted in May 2023 in the emergency department as she had presented with high blood pressure, tachycardia, left-sided chest pain.  Their evaluation suggested that she may have been having a panic attack as cardiac workup did not suggest any coronary etiology of her symptoms.    She denies any smoking history though has had over 12 years of secondhand smoke from her ex- who smoked indoors.  Occupation: She cleans homes for work, using a variety of cleaning products, but denies respiratory symptoms when using these products or any  episode of acute reaction to such products.  She usually does not wear a mask.    She does not currently have any pets, once had a dog. While living in [place] 30 years ago she had a parakeet for 4 to 5 years and a parrot for 2 years.  She uses down pillows.  Denies any recalled severe respiratory infection such as bacterial pneumonia.  Denied night sweats, weight changes.    The CT showed a 1.7 cm x 1 cm tubular branching nodular opacity in the right lower lobe without any point of comparison prior.  I had recommended repeat imaging, and initially patient was reluctant to pursue.  After initially agreeing during her appointment, she did not schedule the CT in a timely manner, called indicating she was concerned for the radiation associated with a CT and asked whether an MRI could be done in place.  I reassured her that MRI was not the appropriate imaging modality in order to investigate, and she eventually agreed to undergo repeat CT scan.  On the repeat scan the opacity appears much less densely consolidated/confluent and and smaller in size.  No new nodular opacities noted (see images below).    May 2023      October 2023        She continues to deny any respiratory symptoms, denies SOB, cough, chest pain.            Past Medical History:     Past Medical History:   Diagnosis Date    Hypertension             Past Surgical History:   No past surgical history on file.         Social History:     Social History     Socioeconomic History    Marital status:      Spouse name: Not on file    Number of children: Not on file    Years of education: Not on file    Highest education level: Not on file   Occupational History    Occupation: Cleaning   Tobacco Use    Smoking status: Never    Smokeless tobacco: Never   Substance and Sexual Activity    Alcohol use: Not on file    Drug use: Not on file    Sexual activity: Not on file   Other Topics Concern    Not on file   Social History Narrative    Not on file     Social  Determinants of Health     Financial Resource Strain: Not on file   Food Insecurity: Not on file   Transportation Needs: Not on file   Physical Activity: Not on file   Stress: Not on file   Social Connections: Not on file   Interpersonal Safety: Not on file   Housing Stability: Not on file             Family History:   No family history on file.        Immunizations:     There is no immunization history on file for this patient.       Allergies:   No Known Allergies       Medications:     Current Outpatient Medications   Medication    candesartan (ATACAND) 16 MG tablet    sertraline (ZOLOFT) 50 MG tablet     No current facility-administered medications for this visit.          Review of Systems:   The Review of Systems is negative other than noted in the HPI         Physical Exam:   There were no vitals taken for this visit.  Patient declined being weighed.     GENERAL APPEARANCE:  alert, and in no apparent distress.  EYES: PERRL, EOMI  HENT: Nasal mucosa with no edema and no hyperemia. No nasal polyps.  MOUTH: Oral mucosa is moist, without any lesions, no tonsillar enlargement, no oropharyngeal exudate.  NECK: supple, no masses, no thyromegaly.  LYMPHATICS: No palpable axillary, cervical, or supraclavicular nodes.  RESP: good air flow throughout.  No crackles. No rhonchi. No wheezes. Normal percussion. Normal chest expansion  CV: Normal S1, S2, regular rhythm, normal rate. No murmur.  No rub. No gallop. No LE edema.   ABDOMEN:  soft, nontender, no HSM or masses. Normal bowel sounds  MS: extremities normal. No clubbing. No cyanosis.  SKIN: no rash on limited exam   NEURO: speech normal, normal strength and tone, normal gait and stance  PSYCH: normal mood and affect         Data:     All cardiac studies reviewed by me.  All imaging studies reviewed by me.    Recent Results (from the past 4368 hour(s))   General PFT Lab (Please always keep checked)    Collection Time: 09/13/23 12:12 PM   Result Value Ref Range     FVC-Pred 2.66 L    FVC-Pre 2.12 L    FVC-%Pred-Pre 79 %    FEV1-Pre 1.60 L    FEV1-%Pred-Pre 75 %    FEV1FVC-Pred 80 %    FEV1FVC-Pre 75 %    FEFMax-Pred 5.84 L/sec    FEFMax-Pre 5.31 L/sec    FEFMax-%Pred-Pre 90 %    FEF2575-Pred 1.94 L/sec    FEF2575-Pre 1.30 L/sec    GRN8690-%Pred-Pre 67 %    FEF2575-Post 1.29 L/sec    RZZ5658-%Pred-Post 66 %    ExpTime-Pre 6.61 sec    FIFMax-Pre 4.47 L/sec    VC-Pred 3.10 L    VC-Pre 2.33 L    VC-%Pred-Pre 75 %    IC-Pred 2.28 L    IC-Pre 2.14 L    IC-%Pred-Pre 94 %    ERV-Pred 0.74 L    ERV-Pre 0.19 L    ERV-%Pred-Pre 25 %    FEV1FEV6-Pred 80 %    FEV1FEV6-Pre 78 %    FRCPleth-Pred 2.57 L    FRCPleth-Pre 2.43 L    FRCPleth-%Pred-Pre 94 %    RVPleth-Pred 1.71 L    RVPleth-Pre 2.24 L    RVPleth-%Pred-Pre 130 %    TLCPleth-Pred 4.80 L    TLCPleth-Pre 4.57 L    TLCPleth-%Pred-Pre 95 %    DLCOunc-Pred 18.72 ml/min/mmHg    DLCOunc-Pre 22.60 ml/min/mmHg    DLCOunc-%Pred-Pre 120 %    VA-Pre 3.84 L    VA-%Pred-Pre 87 %    FEV1SVC-Pred 68 %    FEV1SVC-Pre 68 %                       Assessment and Plan:   Abnormal thoracic imaging  Bronchiectasis  Nodular opacity  No respiratory symptoms, incidentally found abnormality, focal in RLL. This is suspicious for the sequelae of a prior infection or from potential recurrent aspiration given GERD symptoms. Nodular consolidative process that has since improved likely represents mucus plugging or acute findings shortly after aspiration event. As she denies cough or perceptible dysphagia or aspiration we discussed more conservative watchful waiting as opposed to pursuing any further workup. Similarly, would not recommend any further imaging follow up unless patient develops new respiratory or constitutional symptoms.   May follow up prn    I spent a total of 43 minutes on the day of the encounter dedicated to the office visit with Yane P Hamlin.    This included review of vitals, labs, imaging, other diagnostic tests , face-to-face interaction,  physical exam, counseling the patient +/- family members, and/or coordinating care.    Gael Red MD

## 2024-01-11 ENCOUNTER — THERAPY VISIT (OUTPATIENT)
Dept: SLEEP MEDICINE | Facility: CLINIC | Age: 64
End: 2024-01-11
Attending: PHYSICIAN ASSISTANT
Payer: COMMERCIAL

## 2024-01-11 DIAGNOSIS — F41.9 ANXIETY: ICD-10-CM

## 2024-01-11 DIAGNOSIS — R06.00 DYSPNEA AND RESPIRATORY ABNORMALITY: ICD-10-CM

## 2024-01-11 DIAGNOSIS — I10 ESSENTIAL HYPERTENSION: ICD-10-CM

## 2024-01-11 DIAGNOSIS — R06.89 DYSPNEA AND RESPIRATORY ABNORMALITY: ICD-10-CM

## 2024-01-11 DIAGNOSIS — R53.83 MALAISE AND FATIGUE: ICD-10-CM

## 2024-01-11 DIAGNOSIS — R53.81 MALAISE AND FATIGUE: ICD-10-CM

## 2024-01-11 DIAGNOSIS — Z72.820 LACK OF ADEQUATE SLEEP: ICD-10-CM

## 2024-01-11 DIAGNOSIS — G47.30 SLEEP APNEA, UNSPECIFIED TYPE: ICD-10-CM

## 2024-01-11 PROCEDURE — 95810 POLYSOM 6/> YRS 4/> PARAM: CPT | Performed by: INTERNAL MEDICINE

## 2024-01-12 PROBLEM — K57.30 DIVERTICULAR DISEASE OF LARGE INTESTINE: Status: ACTIVE | Noted: 2021-06-28

## 2024-01-12 PROBLEM — Z86.0100 HISTORY OF COLONIC POLYPS: Status: ACTIVE | Noted: 2021-06-28

## 2024-01-12 PROBLEM — G47.33 OSA (OBSTRUCTIVE SLEEP APNEA): Chronic | Status: ACTIVE | Noted: 2023-09-04

## 2024-01-12 PROBLEM — F41.9 ANXIETY: Chronic | Status: ACTIVE | Noted: 2023-09-05

## 2024-01-12 NOTE — PROCEDURES
"         SLEEP STUDY INTERPRETATION  DIAGNOSTIC POLYSOMNOGRAPHY REPORT      Patient: MARIA LUISA NUNN  YOB: 1960  Study Date: 1/11/2024  MRN: [id number]  Referring Provider: No Referring MD  Ordering Provider: CARLOTA Leija Abass    Indications for Polysomnography: The patient is a 63 year old Female who is 5' 2\" and weighs 153.0 lbs. Her BMI is 27.8, [place] sleepiness scale 2 and neck circumference is 37 cm.   A diagnostic polysomnogram was performed to evaluate for snoring, witnessed apnea, non-refreshing sleep and daytime fatigue and sleepiness. Co-morbid HTN.    Polysomnogram Data: A full night polysomnogram recorded the standard physiologic parameters including EEG, EOG, EMG, ECG, nasal and oral airflow. Respiratory parameters of chest and abdominal movements were recorded with respiratory inductance plethysmography. Oxygen saturation was recorded by pulse oximetry. Hypopnea scoring rule used: 1B 4%.    Sleep Architecture:    The total recording time of the polysomnogram was 366.8 minutes. The total sleep time was 325.5 minutes. Sleep latency was decreased at 7.8 minutes without the use of a sleep aid. REM latency was 68.5 minutes. Arousal index was 31.0 arousals per hour. Sleep efficiency was normal at 88.7%. Wake after sleep onset was 33.5 minutes. The patient spent 7.2% of total sleep time in Stage N1, 61.4% in Stage N2, 9.4% in Stage N3, and 22.0% in REM. Time in REM supine was 55.0 minutes.    Respiration:    Events ? The polysomnogram revealed a presence of 97 obstructive, 2 central, and 0 mixed apneas resulting in an apnea index of 18.2 events per hour. There were 64 obstructive hypopneas and 0 central hypopneas resulting in an obstructive hypopnea index of 11.8 and central hypopnea index of 0 events per hour. The combined apnea/hypopnea index was 30.0 events per hour (central apnea/hypopnea index was 0.4 events per hour). The REM AHI was 62.1 events per hour. The supine AHI was 33.0, " non-supine AHI 12.6 events per hour. The RERA index was 0.4 events per hour.  The RDI was 30.4 events per hour.  Snoring - was reported as mild to moderate   Respiratory rate and pattern - was notable for normal respiratory rate and pattern.  Sustained Sleep Associated Hypoventilation - Transcutaneous carbon dioxide monitoring was not used, however significant hypoventilation was not suggested by oximetry   Sleep Associated Hypoxemia - (Greater than 5 minutes O2 sat at or below 88%) was present. Baseline oxygen saturation was 89.9%. Lowest oxygen saturation was 66.0%. Time spent less than or equal to 88% was 74.4 minutes. Time spent less than or equal to 89% was 112.5 minutes.    Movement Activity:    Periodic Limb Activity - There were 22 PLMs during the entire study. The PLM index was 4.1 movements per hour. The PLM Arousal Index was 0.7 per hour.  REM EMG Activity - Excessive transient/sustained muscle activity was not present.  Nocturnal Behavior - Abnormal sleep related behaviors were not noted    Bruxism - None apparent.    Cardiac Summary:    The average pulse rate was 72.4 bpm. The minimum pulse rate was 66.0 bpm while the maximum pulse rate was 91.0 bpm.  Arrhythmias were not noted.      Assessment:   Severe obstructive sleep apnea with hypoxemia     Recommendations:  Consider repeat polysomnography with full night titration of positive airway pressure therapy for the control of sleep disordered breathing.  Based on the presence of mild/moderate obstructive sleep apnea and excessive daytime sleepiness, treatment could be empirically initiated with Auto?titrating PAP therapy with a range of 7 to 18 cmH2O. Recommend clinical follow up with sleep management team. Consider follow-up oximetry or HSAT on treatment   Patient may be a candidate for dental appliance through referral to Sleep Dentistry for the treatment of obstructive sleep apnea and/or socially disruptive snoring.  If devices are not acceptable or  effective, patient may benefit from evaluation of possible surgical options. If she is interested, would recommend referral to specialized ENT-Sleep provider.  Advice regarding the risks of drowsy driving.  Suggest optimizing sleep schedule and avoiding sleep deprivation.  Pharmacologic therapy should be used for management of restless legs syndrome only if present and clinically indicated and not based on the presence of periodic limb movements alone.    Diagnostic Codes:   Obstructive Sleep Apnea G47.33  Sleep Hypoxemia/Hypoventilation G47.36            _____________________________________   Electronically Signed By: Antonio Little MD 1/12/24           Range(%) Time in range (min)   0.0 - 89.0 112.5   0.0 - 88.0 74.4         Stage Min(mm Hg) Max(mm Hg)   Wake - -   NREM(1+2+3) - -   REM - -       Range(mmHg) Time in range (min)   55.0 - 100.0 -   Excluded data <20.0 & >65.0 367.5

## 2024-01-15 LAB — SLPCOMP: NORMAL

## 2024-01-15 NOTE — RESULT ENCOUNTER NOTE
Results discussed directly with patient while patient was present. Any further details documented in the note.   Gael Red MD
n/a

## 2024-03-14 ENCOUNTER — CARE COORDINATION (OUTPATIENT)
Dept: SLEEP MEDICINE | Facility: CLINIC | Age: 64
End: 2024-03-14
Payer: COMMERCIAL

## 2024-03-14 DIAGNOSIS — G47.33 OSA (OBSTRUCTIVE SLEEP APNEA): Primary | ICD-10-CM

## 2024-03-14 NOTE — PROGRESS NOTES
Yane called trying to set up a video appointment. She is currently out [place] and needs a CPAP and supplies. She stated that she will be there for 6 months. Please advise. You can reach her at [phone].

## 2024-06-17 ENCOUNTER — TELEPHONE (OUTPATIENT)
Dept: SLEEP MEDICINE | Facility: CLINIC | Age: 64
End: 2024-06-17
Payer: COMMERCIAL

## 2024-06-17 NOTE — TELEPHONE ENCOUNTER
Pt was called to reschedule appt on 7/30/24 with Chiquis Leija. PAULOM for them to call back. [employer] message sent also.      Olga KLEIN    Kamini Perla

## 2024-06-18 ENCOUNTER — TELEPHONE (OUTPATIENT)
Dept: SLEEP MEDICINE | Facility: CLINIC | Age: 64
End: 2024-06-18
Payer: COMMERCIAL

## 2024-06-18 NOTE — TELEPHONE ENCOUNTER
General Call    Contacts         Type Contact Phone/Fax    06/18/2024 12:09 PM CDT Phone (Incoming) Yane Hamlin P (Self) [phone] (H)          Reason for Call:  Patient is returning our call about rescheduling her 7/31 appointment, next opening with Chiquis Leija isn't until 12/03/24. Patient is wanting to know if someone can call her and explain what her PSG test results were and what would be her next step. She doesn't want to wait until December is she needs to start therapy.    Date of last appointment with provider: 09/05/23 Chiquis Leija    Okay to leave a detailed message?: Yes at Cell number on file:    Telephone Information:   Mobile [phone]     Gayla Ramirez   [facility]  Central Scheduler

## 2024-06-26 NOTE — TELEPHONE ENCOUNTER
Spoke with patient.   Sleep Study 1/11/24  Reviewed recommendations.   Patient would like to discuss with provider.

## 2024-07-08 ENCOUNTER — TELEPHONE (OUTPATIENT)
Dept: SLEEP MEDICINE | Facility: CLINIC | Age: 64
End: 2024-07-08
Payer: COMMERCIAL

## 2024-07-08 DIAGNOSIS — G47.33 OSA (OBSTRUCTIVE SLEEP APNEA): Primary | Chronic | ICD-10-CM

## 2024-07-08 NOTE — TELEPHONE ENCOUNTER
Pt is in [place] She called central scheduling to reschedule video visit again she had family emergency. Will call and let pt know she has to wait until she is back her insurance does not allow video visits after sleep studies and she is out of the country.    Pedrito Taylor, Visit facilitator.

## 2024-07-08 NOTE — TELEPHONE ENCOUNTER
Spoke to pt about appointment she said she can't wait till January for cpap machine but is [place] I explained per her insurance it is pmap and she can't do visit via video it is not covered and she is also out of country the providers are unable to do the visit, She said she can try to make it back in person in October and she was rescheduled. I gave her number to [facility] she already has her order for cpap.    Pedrito Taylor, Visit facilitator.

## (undated) RX ORDER — ALBUTEROL SULFATE 0.83 MG/ML
SOLUTION RESPIRATORY (INHALATION)
Status: DISPENSED
Start: 2023-09-13